# Patient Record
Sex: FEMALE | Race: BLACK OR AFRICAN AMERICAN | NOT HISPANIC OR LATINO | Employment: FULL TIME | URBAN - METROPOLITAN AREA
[De-identification: names, ages, dates, MRNs, and addresses within clinical notes are randomized per-mention and may not be internally consistent; named-entity substitution may affect disease eponyms.]

---

## 2017-07-10 ENCOUNTER — TRANSFERRED RECORDS (OUTPATIENT)
Dept: HEALTH INFORMATION MANAGEMENT | Facility: CLINIC | Age: 27
End: 2017-07-10

## 2017-07-10 LAB — PAP-ABSTRACT: NORMAL

## 2018-11-01 ENCOUNTER — TRANSFERRED RECORDS (OUTPATIENT)
Dept: HEALTH INFORMATION MANAGEMENT | Facility: CLINIC | Age: 28
End: 2018-11-01

## 2018-11-01 LAB — PAP SMEAR - HIM PATIENT REPORTED: NORMAL

## 2019-06-21 ENCOUNTER — OFFICE VISIT (OUTPATIENT)
Dept: FAMILY MEDICINE | Facility: CLINIC | Age: 29
End: 2019-06-21
Payer: COMMERCIAL

## 2019-06-21 DIAGNOSIS — N76.0 BV (BACTERIAL VAGINOSIS): Primary | ICD-10-CM

## 2019-06-21 DIAGNOSIS — R03.0 ELEVATED BLOOD PRESSURE READING WITHOUT DIAGNOSIS OF HYPERTENSION: ICD-10-CM

## 2019-06-21 DIAGNOSIS — Z11.4 SCREENING FOR HUMAN IMMUNODEFICIENCY VIRUS WITHOUT PRESENCE OF RISK FACTORS: ICD-10-CM

## 2019-06-21 DIAGNOSIS — Z00.00 ROUTINE GENERAL MEDICAL EXAMINATION AT A HEALTH CARE FACILITY: Primary | ICD-10-CM

## 2019-06-21 DIAGNOSIS — B96.89 BV (BACTERIAL VAGINOSIS): Primary | ICD-10-CM

## 2019-06-21 LAB
HBA1C MFR BLD: 5.6 % (ref 0–5.6)
SPECIMEN SOURCE: ABNORMAL
TSH SERPL DL<=0.005 MIU/L-ACNC: 1.24 MU/L (ref 0.4–4)
WET PREP SPEC: ABNORMAL

## 2019-06-21 PROCEDURE — 99385 PREV VISIT NEW AGE 18-39: CPT | Mod: 25 | Performed by: FAMILY MEDICINE

## 2019-06-21 PROCEDURE — 90651 9VHPV VACCINE 2/3 DOSE IM: CPT | Performed by: FAMILY MEDICINE

## 2019-06-21 PROCEDURE — 83036 HEMOGLOBIN GLYCOSYLATED A1C: CPT | Performed by: FAMILY MEDICINE

## 2019-06-21 PROCEDURE — 36415 COLL VENOUS BLD VENIPUNCTURE: CPT | Performed by: FAMILY MEDICINE

## 2019-06-21 PROCEDURE — 87389 HIV-1 AG W/HIV-1&-2 AB AG IA: CPT | Performed by: FAMILY MEDICINE

## 2019-06-21 PROCEDURE — 87210 SMEAR WET MOUNT SALINE/INK: CPT | Performed by: FAMILY MEDICINE

## 2019-06-21 PROCEDURE — 90471 IMMUNIZATION ADMIN: CPT | Performed by: FAMILY MEDICINE

## 2019-06-21 PROCEDURE — 84443 ASSAY THYROID STIM HORMONE: CPT | Performed by: FAMILY MEDICINE

## 2019-06-21 RX ORDER — METRONIDAZOLE 500 MG/1
500 TABLET ORAL 2 TIMES DAILY
Qty: 14 TABLET | Refills: 0 | Status: SHIPPED | OUTPATIENT
Start: 2019-06-21 | End: 2019-07-03

## 2019-06-21 SDOH — HEALTH STABILITY: MENTAL HEALTH: HOW OFTEN DO YOU HAVE A DRINK CONTAINING ALCOHOL?: MONTHLY OR LESS

## 2019-06-21 SDOH — HEALTH STABILITY: MENTAL HEALTH: HOW OFTEN DO YOU HAVE SIX OR MORE DRINKS ON ONE OCCASION?: NEVER

## 2019-06-21 SDOH — HEALTH STABILITY: MENTAL HEALTH: HOW MANY DRINKS CONTAINING ALCOHOL DO YOU HAVE ON A TYPICAL DAY WHEN YOU ARE DRINKING?: 1 OR 2

## 2019-06-21 ASSESSMENT — PAIN SCALES - GENERAL: PAINLEVEL: NO PAIN (0)

## 2019-06-21 ASSESSMENT — MIFFLIN-ST. JEOR: SCORE: 2119.31

## 2019-06-21 NOTE — RESULT ENCOUNTER NOTE
Please call    Dear Jennifer,   Here are your recent results.  Your vaginal swab was negative for trichomonas but it was positive for Clue cells which are consistent with BV.   - Your vaginal swab shows clue cells which are consistent with bacterial vaginosis. Which is basically a shift in the normal bacterial wolf in your vaginal area. Sometimes this resolves by itself and does not need treatment. For a small number of people, they notice a discharge or odor.  Probiotics and eating yogurt will help prevent it in the future. I sent a prescription of metronidazole to your pharmacy which can help with the return to normal bacterial wolf.       Best regards,  Berna Valladares MD

## 2019-06-21 NOTE — NURSING NOTE
"Chief Complaint   Patient presents with     Physical     /81 (BP Location: Left arm, Patient Position: Sitting, Cuff Size: Adult Large)   Pulse 93   Temp 98.2  F (36.8  C) (Oral)   Resp 16   Ht 1.892 m (6' 2.5\")   Wt 139.9 kg (308 lb 7 oz)   LMP 05/31/2019 (Approximate)   SpO2 98%   Breastfeeding? No   BMI 39.07 kg/m   Estimated body mass index is 39.07 kg/m  as calculated from the following:    Height as of this encounter: 1.892 m (6' 2.5\").    Weight as of this encounter: 139.9 kg (308 lb 7 oz).  bp completed using cuff size: large      Health Maintenance addressed:  NONE    N/a  Nelly Damon CMA on 6/21/2019 at 11:26 AM                "

## 2019-06-21 NOTE — NURSING NOTE
Chief Complaint   Patient presents with     Physical     Screening Questionnaire for Adult Immunization    Are you sick today?   No   Do you have allergies to medications, food, a vaccine component or latex?   Yes   Have you ever had a serious reaction after receiving a vaccination?   No   Do you have a long-term health problem with heart disease, lung disease, asthma, kidney disease, metabolic disease (e.g. diabetes), anemia, or other blood disorder?   No   Do you have cancer, leukemia, HIV/AIDS, or any other immune system problem?   No   In the past 3 months, have you taken medications that affect  your immune system, such as prednisone, other steroids, or anticancer drugs; drugs for the treatment of rheumatoid arthritis, Crohn s disease, or psoriasis; or have you had radiation treatments?   No   Have you had a seizure, or a brain or other nervous system problem?   No   During the past year, have you received a transfusion of blood or blood     products, or been given immune (gamma) globulin or antiviral drug?   No   For women: Are you pregnant or is there a chance you could become        pregnant during the next month?   No   Have you received any vaccinations in the past 4 weeks?   No     Immunization questionnaire answers were all negative.        Per orders of Dr. Valladares, injection of HPV given by Nelly Damon. Patient instructed to remain in clinic for 15 minutes afterwards, and to report any adverse reaction to me immediately.       Screening performed by Nelly Damon on 6/21/2019 at 12:12 PM.

## 2019-06-21 NOTE — PROGRESS NOTES
SUBJECTIVE:   CC: Jennifer Reyes is an 28 year old woman who presents for preventive health visit.     Healthy Habits:     Getting at least 3 servings of Calcium per day:  Yes    Bi-annual eye exam:  Yes    Dental care twice a year:  Yes    Sleep apnea or symptoms of sleep apnea:  Excessive snoring    Diet:  Regular (no restrictions)    Frequency of exercise:  2-3 days/week    Duration of exercise:  30-45 minutes    Taking medications regularly:  Yes    Medication side effects:  None    PHQ-2 Total Score: 0    Additional concerns today:  Yes    Today's PHQ-2 Score:   PHQ-2 ( 1999 [employer]) 6/21/2019   Q1: Little interest or pleasure in doing things 0   Q2: Feeling down, depressed or hopeless 0   PHQ-2 Score 0   Q1: Little interest or pleasure in doing things Not at all   Q2: Feeling down, depressed or hopeless Not at all   PHQ-2 Score 0       Abuse: Current or Past(Physical, Sexual or Emotional)- No  Do you feel safe in your environment? Yes    Social History     Tobacco Use     Smoking status: Never Smoker     Smokeless tobacco: Never Used   Substance Use Topics     Alcohol use: Yes     Frequency: Monthly or less     Drinks per session: 1 or 2     Binge frequency: Never     Comment: Soc-wine beer mixed drink     If you drink alcohol do you typically have >3 drinks per day or >7 drinks per week? No    Alcohol Use 6/21/2019   Prescreen: >3 drinks/day or >7 drinks/week? No   Prescreen: >3 drinks/day or >7 drinks/week? -   No flowsheet data found.    Reviewed orders with patient.  Reviewed health maintenance and updated orders accordingly - Yes  Lab work is in process    Mammogram not appropriate for this patient based on age.    Pertinent mammograms are reviewed under the imaging tab.  History of abnormal Pap smear: NO - under age 21, PAP not appropriate for age     Reviewed and updated as needed this visit by clinical staff  Tobacco  Allergies  Meds  Surg Hx  Fam Hx  Soc Hx        Reviewed and updated as  "needed this visit by Provider            Review of Systems  CONSTITUTIONAL: NEGATIVE for fever, chills, change in weight  INTEGUMENTARU/SKIN: NEGATIVE for worrisome rashes, moles or lesions  EYES: NEGATIVE for vision changes or irritation  ENT: NEGATIVE for ear, mouth and throat problems  RESP: NEGATIVE for significant cough or SOB  BREAST: NEGATIVE for masses, tenderness or discharge  CV: NEGATIVE for chest pain, palpitations or peripheral edema  GI: NEGATIVE for nausea, abdominal pain, heartburn, or change in bowel habits  : NEGATIVE for unusual urinary or vaginal symptoms. Periods are regular.  MUSCULOSKELETAL: NEGATIVE for significant arthralgias or myalgia  NEURO: NEGATIVE for weakness, dizziness or paresthesias  PSYCHIATRIC: NEGATIVE for changes in mood or affect     OBJECTIVE:   /81 (BP Location: Left arm, Patient Position: Sitting, Cuff Size: Adult Large)   Pulse 93   Temp 98.2  F (36.8  C) (Oral)   Resp 16   Ht 1.892 m (6' 2.5\")   Wt 139.9 kg (308 lb 7 oz)   LMP 05/31/2019 (Approximate)   SpO2 98%   Breastfeeding? No   BMI 39.07 kg/m    Physical Exam  GENERAL: healthy, alert and no distress  EYES: Eyes grossly normal to inspection, PERRL and conjunctivae and sclerae normal  HENT: ear canals and TM's normal, nose and mouth without ulcers or lesions  NECK: no adenopathy, no asymmetry, masses, or scars and thyroid normal to palpation  RESP: lungs clear to auscultation - no rales, rhonchi or wheezes  BREAST: normal without masses, tenderness or nipple discharge and no palpable axillary masses or adenopathy  CV: regular rate and rhythm, normal S1 S2, no S3 or S4, no murmur, click or rub, no peripheral edema and peripheral pulses strong  ABDOMEN: soft, nontender, no hepatosplenomegaly, no masses and bowel sounds normal  MS: no gross musculoskeletal defects noted, no edema  SKIN: no suspicious lesions or rashes  NEURO: Normal strength and tone, mentation intact and speech normal  PSYCH: mentation " appears normal, affect normal/bright    Diagnostic Test Results:  Labs reviewed in Epic    ASSESSMENT/PLAN:       ICD-10-CM    1. Routine general medical examination at a health care facility Z00.00 TSH with free T4 reflex     Hemoglobin A1c     ADMIN 1st VACCINE     Wet prep   2. Screening for human immunodeficiency virus without presence of risk factors Z11.4 HIV Antigen Antibody Combo   3. Elevated blood pressure reading without diagnosis of hypertension R03.0      [facility]       COUNSELING:  Reviewed preventive health counseling, as reflected in patient instructions       Regular exercise       Healthy diet/nutrition       Vision screening       Hearing screening    Counseling Resources:  ATP IV Guidelines  Pooled Cohorts Equation Calculator  Breast Cancer Risk Calculator  FRAX Risk Assessment  ICSI Preventive Guidelines  Dietary Guidelines for Americans, 2010  USDA's MyPlate  ASA Prophylaxis  Lung CA Screening    Berna Valladares MD  [facility]

## 2019-06-24 LAB — HIV 1+2 AB+HIV1 P24 AG SERPL QL IA: NONREACTIVE

## 2019-07-01 ENCOUNTER — TELEPHONE (OUTPATIENT)
Dept: NURSING | Facility: CLINIC | Age: 29
End: 2019-07-01

## 2019-07-01 NOTE — TELEPHONE ENCOUNTER
Patient returning clinic call regarding results. Gave per epic, she picked up RX for   metroNIDAZOLE (FLAGYL) 500 MG tablet 14 tablet 0 6/21/2019 6/28/2019 --   Sig - Route: Take 1 tablet (500 mg) by mouth 2 times daily for 7 days - Oral     She will call clinic in am for a referral MD was suppose to give her. No triage was needed.  Debi Bach RN [facility]

## 2019-07-02 ENCOUNTER — TELEPHONE (OUTPATIENT)
Dept: FAMILY MEDICINE | Facility: CLINIC | Age: 29
End: 2019-07-02

## 2019-07-02 NOTE — TELEPHONE ENCOUNTER
"FS,   Patient had recent physical with you 6/21/2019  States she mentioned having issues with weight loss  You advised checking labs  A1c and TSH normal  Pt states you mentioned after labs you may Rx a \"metabolism booster\"  Do you want a phone visit to discuss medication options with pt?  Please advise  Thanks,  Zhanna ROBIN RN    "

## 2019-07-02 NOTE — TELEPHONE ENCOUNTER
Reason for Call:  Medication or medication refill:    Do you use a [facility]?  Name of the pharmacy and phone number for the current request:    [id number] - [place], [place] - [address]      Name of the medication requested:   Weight loss medication, metabolism booster discussed at appt with Blaine    Other request:   Pt is following up with results concerning her thyroid and diabetes, however she is inquiring about medication.      Can we leave a detailed message on this number? YES    Phone number patient can be reached at: Home number on file [phone] (home)    Best Time: Any     Call taken on 7/2/2019 at 11:48 AM by Jesika Connelly

## 2019-07-02 NOTE — TELEPHONE ENCOUNTER
Lab result notes:  Your vaginal swab was negative for trichomonas but it was positive for Clue cells which are consistent with BV.   - Your vaginal swab shows clue cells which are consistent with bacterial vaginosis. Which is basically a shift in the normal bacterial wolf in your vaginal area. Sometimes this resolves by itself and does not need treatment. For a small number of people, they notice a discharge or odor.  Probiotics and eating yogurt will help prevent it in the future. I sent a prescription of metronidazole to your pharmacy which can help with the return to normal bacterial wolf    No mention of weigh loss med in OV notes  VM left asking patient to call back.  Aurelia Souza RN

## 2019-07-03 ENCOUNTER — TELEPHONE (OUTPATIENT)
Dept: FAMILY MEDICINE | Facility: CLINIC | Age: 29
End: 2019-07-03

## 2019-07-03 ENCOUNTER — VIRTUAL VISIT (OUTPATIENT)
Dept: FAMILY MEDICINE | Facility: CLINIC | Age: 29
End: 2019-07-03
Payer: COMMERCIAL

## 2019-07-03 DIAGNOSIS — E66.01 MORBID OBESITY (H): ICD-10-CM

## 2019-07-03 DIAGNOSIS — E66.01 MORBID OBESITY (H): Primary | ICD-10-CM

## 2019-07-03 PROCEDURE — 99441 ZZC PHYSICIAN TELEPHONE EVALUATION 5-10 MIN: CPT | Performed by: FAMILY MEDICINE

## 2019-07-03 RX ORDER — TOPIRAMATE 25 MG/1
TABLET, FILM COATED ORAL
Qty: 63 TABLET | Refills: 0 | Status: SHIPPED | OUTPATIENT
Start: 2019-07-03 | End: 2019-07-05

## 2019-07-03 RX ORDER — TOPIRAMATE 25 MG/1
TABLET, FILM COATED ORAL
Qty: 63 TABLET | Refills: 0 | Status: SHIPPED | OUTPATIENT
Start: 2019-07-03 | End: 2019-07-03

## 2019-07-03 RX ORDER — PHENTERMINE HYDROCHLORIDE 15 MG/1
15 CAPSULE ORAL EVERY MORNING
Qty: 30 CAPSULE | Refills: 0 | Status: SHIPPED | OUTPATIENT
Start: 2019-07-03 | End: 2019-07-31

## 2019-07-03 NOTE — PROGRESS NOTES
"Jennifer Reyes is a 28 year old female who is being evaluated via a telephone visit.      The patient has been notified of following (by M.A) Nelly Damon [facility] on 7/3/2019 at 12:49 PM       \"We have found that certain health care needs can be provided without the need for a physical exam.  This service lets us provide the care you need with a short phone conversation.  If a prescription is necessary we can send it directly to your pharmacy.  If lab work is needed we can place an order for that and you can then stop by our lab to have the test done at a later time.    This telephone visit will be conducted via 3 way call with the you (the patient) , the physician/provider, and a me all on the line at the same time.  This allows your physician/provider to have the phone conversation with you while I will be taking notes for your medical record.  We will have full access to your [facility] medical record during this entire phone call.    Since this is like an office visit,  will bill your insurance company for this service.  Please check with your medical insurance if this type of telephone/virtual is covered . You may be responsible for the cost of this service if insurance coverage is denied.  The typical cost is $30 (10min), $59(11-20min) and $85 (21-30min)     If during the course of the call the physician/provider feels a telephone visit is not appropriate, you will not be charged for this service\"    Consent has been obtained for this service by care team member: yes.  See the scanned image in the medical record.    S: The patient was seen previously for morbid obesity. She was tested for diabetes and thyroid and both tests were normal. She desires to start a medication to lose weight. She does not have any chronic medical conditions. Does not take any other medications at this time.     A/P:   Multiple medications were discussed with the patient. I would recommend proceeding with phentermine and Topamax. " Patient agrees with plan. Most possible side-effects were discussed with the patient. She was advised to schedule an appointment in 3-4 weeks to review medications.         I have reviewed the note as documented above.  This accurately captures the substance of my conversation with the patient,    Assessment/Plan:    ICD-10-CM    1. Morbid obesity (H) E66.01 phentermine (ADIPEX-P) 15 MG capsule     topiramate (TOPAMAX) 25 MG tablet       Total time of call between patient and provider was 7 minutes       Berna Valladares MD  [facility]  PAGER: [phone] or (W): [phone]      ===================================================

## 2019-07-03 NOTE — PATIENT INSTRUCTIONS
MEDICATION STARTED AT THIS APPOINTMENT  We are starting topiramate at bedtime.  Start one tab, 25 mg, for a week. Go up to 50 mg (2 tabs) for the next week. At the third week, take  3 tabs (75 mg).  Stay at 3 tabs until you are seen again.     (Do not stop taking it if you don't think it's working. For some people it works even though they do not feel much different.)    Topiramate (Topamax) is a medication that is used most often to treat migraine headaches or for seizures. It has also been found to help with weight loss. Although it's not currently FDA approved for weight loss, it has been used safely for a number of years to help people who are carrying extra weight.     Just how topiramate helps with weight loss has not been exactly determined. However it seems to work on areas of the brain to quiet down signals related to eating.      Topiramate may make you:    >feel less interest in eating in between meals   >think less about food and eating   >find it easier to push the plate away   >find giving up pop easier    >have an easier time eating less    For some of our patients, the pills work right away. They feel and think quite differently about food. Other patients don't feel much of a change but find in fact they have lost weight! Like all weight loss medications, topiramate works best when you help it work.  This means:    1) Have less tempting high calorie (fattening) food around the house or office    2) Have lower calorie food (fruits, vegetables,low fat meats and dairy) for snacks    3) Eat out only one time or less each week.   4) Eat your meals at a table with the TV or computer off.    Side-effects. Topiramate is generally well tolerated. The main side-effects we see are:   Tingling in hands,feet, or face (usually not very troublesome)   Mental confusion and word finding trouble (about 10% of patients have this.)     Feeling sleepy or a bit dopey- this goes away very soon after starting.    One of the  dangers of topiramate is the possibility of birth defects--if you get pregnant when you are on it, there is the risk that your baby will be born with a cleft lip or palate.  If you are on topiramate and of child bearing age, you need to be on a reliable form of birth control or refrain from sexual intercourse.     Please refer to the pharmacy insert for more information on side-effects. Since many pharmacists are not familiar with the use of topiramate in weight loss, calling the clinic will get you the most accurate information on the use of this medication for weight loss.     In order to get refills of this or any medication we prescribe you must be seen in the clinic.

## 2019-07-04 NOTE — TELEPHONE ENCOUNTER
Clinic Action Needed:No  Reason for Call: Patient  her hard copy of her Adipex-P at the clinic before she left, but didn't realize the topamax was in hard copy form also. Would like the Topamax Rx sent to the [id number] [place], [place] - [address]. She is there now. Done.   Routed to: None    Rosio Cortes RN, [facility]

## 2019-07-05 ENCOUNTER — TELEPHONE (OUTPATIENT)
Dept: FAMILY MEDICINE | Facility: CLINIC | Age: 29
End: 2019-07-05

## 2019-07-05 DIAGNOSIS — E66.01 MORBID OBESITY (H): ICD-10-CM

## 2019-07-05 RX ORDER — TOPIRAMATE 25 MG/1
TABLET, FILM COATED ORAL
Qty: 63 TABLET | Refills: 0 | Status: SHIPPED | OUTPATIENT
Start: 2019-07-05 | End: 2019-07-31

## 2019-07-05 NOTE — TELEPHONE ENCOUNTER
Reason for Call:  Medication or medication refill:    Do you use a [facility]?  Name of the pharmacy and phone number for the current request:  [id number] - [place], [place] - [address]    Name of the medication requested: topiramate (TOPAMAX) 25 MG tablet    Other request:  Pt would like to  this rx at the pharmacy instead of hard copy     Can we leave a detailed message on this number? YES    Phone number patient can be reached at: Home number on file [phone] (home)    Best Time: any    Call taken on 7/5/2019 at 11:16 AM by Chi Alvarez

## 2019-07-05 NOTE — TELEPHONE ENCOUNTER
Rx printed because sig too long  Shortened sig and eRx'd Topamax  Patient informed  Zhanna ROBIN RN

## 2019-07-31 ENCOUNTER — OFFICE VISIT (OUTPATIENT)
Dept: FAMILY MEDICINE | Facility: CLINIC | Age: 29
End: 2019-07-31
Payer: COMMERCIAL

## 2019-07-31 DIAGNOSIS — E66.01 MORBID OBESITY (H): ICD-10-CM

## 2019-07-31 PROCEDURE — 99213 OFFICE O/P EST LOW 20 MIN: CPT | Performed by: FAMILY MEDICINE

## 2019-07-31 RX ORDER — PHENTERMINE HYDROCHLORIDE 30 MG/1
30 CAPSULE ORAL EVERY MORNING
Qty: 30 CAPSULE | Refills: 0 | Status: SHIPPED | OUTPATIENT
Start: 2019-07-31 | End: 2019-08-30

## 2019-07-31 RX ORDER — TOPIRAMATE 25 MG/1
75 TABLET, FILM COATED ORAL AT BEDTIME
Qty: 90 TABLET | Refills: 0 | Status: SHIPPED | OUTPATIENT
Start: 2019-07-31 | End: 2019-08-30

## 2019-07-31 ASSESSMENT — MIFFLIN-ST. JEOR: SCORE: 2082.86

## 2019-07-31 NOTE — PROGRESS NOTES
"Subjective     Jennifer Reyes is a 29 year old female who presents to clinic today for the following health issues:    HPI   Medication Followup of Topamax and Phentermine     Taking Medication as prescribed: yes    Side Effects:  Dry mouth     Medication Helping Symptoms:  Yes, lost 10lbs in the past month      Patient was started on medications for weight loss management.  She denies any complaints  other than occasional dry mouth.    There is no problem list on file for this patient.    History reviewed. No pertinent surgical history.    Social History     Tobacco Use     Smoking status: Never Smoker     Smokeless tobacco: Never Used   Substance Use Topics     Alcohol use: Yes     Frequency: Monthly or less     Drinks per session: 1 or 2     Binge frequency: Never     Comment: Soc-wine beer mixed drink     Family History   Adopted: Yes           Reviewed and updated as needed this visit by Provider         Review of Systems   ROS COMP: Constitutional, HEENT, cardiovascular, pulmonary, gi and gu systems are negative, except as otherwise noted.      Objective    /87   Pulse 85   Temp 97.5  F (36.4  C) (Oral)   Resp 20   Ht 1.892 m (6' 2.5\")   Wt 136.3 kg (300 lb 6.4 oz)   SpO2 98%   BMI 38.05 kg/m    Body mass index is 38.05 kg/m .  Physical Exam   GENERAL: healthy, alert and no distress  RESP: lungs clear to auscultation - no rales, rhonchi or wheezes  CV: regular rate and rhythm, normal S1 S2, no S3 or S4, no murmur, click or rub, no peripheral edema and peripheral pulses strong    Diagnostic Test Results:  Labs reviewed in Epic        Assessment & Plan       ICD-10-CM    1. Morbid obesity (H) E66.01 topiramate (TOPAMAX) 25 MG tablet     phentermine (ADIPEX-P) 30 MG capsule     Patient was informed that she should present for a face-to-face visit for medication refill.  Dose was increased today from 15mg to 30 mg.    Return in about 1 month (around 8/31/2019) for Follow-up visit.    Berna VALDEZ" MD Bl[facility]  [facility]

## 2019-08-01 RX ORDER — TOPIRAMATE 25 MG/1
TABLET, FILM COATED ORAL
Start: 2019-08-01

## 2019-08-01 NOTE — TELEPHONE ENCOUNTER
"Last Written Prescription Date:  8/30/2019  Last Fill Quantity: 90,  # refills: 0   Last office visit: 7/31/2019 with prescribing provider:  Blaine   Future Office Visit:   Next 5 appointments (look out 90 days)    Aug 30, 2019  9:00 AM CDT  Office Visit with Berna Valladares MD  [facility] [address] [facility] [address]  [phone]   Sep 23, 2019 10:15 AM CDT  Nurse Only with UP NURSE  [facility] ([facility]) [address] [address]  [phone]         Requested Prescriptions   Pending Prescriptions Disp Refills     topiramate (TOPAMAX) 25 MG tablet 63 tablet 0     Sig: Take 1 tab (25mg) by mouth at bedtime for 7 days, then 2 tabs (50mg) at bedtime for 7 days, then 3 tabs (75mg) at bedtime for 14 days       Anti-Seizure Meds Protocol  Failed - 7/31/2019  2:18 PM        Failed - Review Authorizing provider's last note.      Refer to last progress notes: confirm request is for original authorizing provider (cannot be through other providers).          Failed - Normal CBC on file in past 26 months     No lab results found.              Failed - Normal ALT or AST on file in past 26 months     No lab results found.  No lab results found.          Failed - Normal platelet count on file in past 26 months     No lab results found.            Passed - Recent (12 mo) or future (30 days) visit within the authorizing provider's specialty     Patient had office visit in the last 12 months or has a visit in the next 30 days with authorizing provider or within the authorizing provider's specialty.  See \"Patient Info\" tab in inbasket, or \"Choose Columns\" in Meds & Orders section of the refill encounter.              Passed - Medication is active on med list        Passed - No active pregnancy on record        Passed - No positive pregnancy test in last 12 months          "

## 2019-08-29 DIAGNOSIS — E66.01 MORBID OBESITY (H): ICD-10-CM

## 2019-08-29 RX ORDER — TOPIRAMATE 25 MG/1
75 TABLET, FILM COATED ORAL AT BEDTIME
Qty: 90 TABLET | Refills: 0 | Status: CANCELLED | OUTPATIENT
Start: 2019-08-29

## 2019-08-29 NOTE — TELEPHONE ENCOUNTER
"topiramate (TOPAMAX) 25 MG tablet  Last Written Prescription Date:  07/31/20  Last Fill Quantity: 90,  # refills:  0  Last office visit: 7/31/2019 with prescribing provider:  AMINTA   Future Office Visit: Nothing at this time scheduled.    Next 5 appointments (look out 90 days)    Aug 30, 2019  9:00 AM CDT  Office Visit with Berna Valladares MD  [facility] ([facility]) [address] [address]  [phone]   Sep 23, 2019 10:15 AM CDT  Nurse Only with UP NURSE  [facility] [place] ([facility]) [address] [address]  [phone]         Requested Prescriptions   Pending Prescriptions Disp Refills     topiramate (TOPAMAX) 25 MG tablet 90 tablet 0     Sig: Take 3 tablets (75 mg) by mouth At Bedtime       Anti-Seizure Meds Protocol  Failed - 8/29/2019  2:31 PM        Failed - Review Authorizing provider's last note.      Refer to last progress notes: confirm request is for original authorizing provider (cannot be through other providers).          Failed - Normal CBC on file in past 26 months     No lab results found.              Failed - Normal ALT or AST on file in past 26 months     No lab results found.  No lab results found.          Failed - Normal platelet count on file in past 26 months     No lab results found.            Passed - Recent (12 mo) or future (30 days) visit within the authorizing provider's specialty     Patient had office visit in the last 12 months or has a visit in the next 30 days with authorizing provider or within the authorizing provider's specialty.  See \"Patient Info\" tab in inbasket, or \"Choose Columns\" in Meds & Orders section of the refill encounter.              Passed - Medication is active on med list        Passed - No active pregnancy on record        Passed - No positive pregnancy test in last 12 months        "

## 2019-08-30 ENCOUNTER — OFFICE VISIT (OUTPATIENT)
Dept: FAMILY MEDICINE | Facility: CLINIC | Age: 29
End: 2019-08-30
Payer: COMMERCIAL

## 2019-08-30 DIAGNOSIS — E66.01 MORBID OBESITY (H): ICD-10-CM

## 2019-08-30 PROCEDURE — 99213 OFFICE O/P EST LOW 20 MIN: CPT | Performed by: FAMILY MEDICINE

## 2019-08-30 RX ORDER — TOPIRAMATE 25 MG/1
75 TABLET, FILM COATED ORAL AT BEDTIME
Qty: 90 TABLET | Refills: 3 | Status: SHIPPED | OUTPATIENT
Start: 2019-08-30 | End: 2019-12-02

## 2019-08-30 RX ORDER — PHENTERMINE HYDROCHLORIDE 30 MG/1
30 CAPSULE ORAL EVERY MORNING
Qty: 30 CAPSULE | Refills: 3 | Status: SHIPPED | OUTPATIENT
Start: 2019-08-30 | End: 2019-10-07

## 2019-08-30 ASSESSMENT — MIFFLIN-ST. JEOR: SCORE: 2045.66

## 2019-08-30 NOTE — PROGRESS NOTES
"Steve Reyes is a 29 year old female who presents to clinic today for the following health issues:    HPI   Patient is here for a follow up visit, and medication check for weight loss.    Steve Reyes is a 29 year old female who presents to clinic today for the following health issues:    HPI   Medication Followup of Topamax and Phentermine     Taking Medication as prescribed: yes    Side Effects:  Dry mouth     Medication Helping Symptoms:  Yes, lost 10lbs in the past month      Patient was started on medications for weight loss management.  She denies any complaints  other than occasional dry mouth.    There is no problem list on file for this patient.    No past surgical history on file.    Social History     Tobacco Use     Smoking status: Never Smoker     Smokeless tobacco: Never Used   Substance Use Topics     Alcohol use: Yes     Frequency: Monthly or less     Drinks per session: 1 or 2     Binge frequency: Never     Comment: Soc-wine beer mixed drink     Family History   Adopted: Yes           Reviewed and updated as needed this visit by Provider         Review of Systems   ROS COMP: Constitutional, HEENT, cardiovascular, pulmonary, gi and gu systems are negative, except as otherwise noted.      Objective    /84   Pulse 105   Temp 97.8  F (36.6  C) (Oral)   Ht 1.892 m (6' 2.5\")   Wt 132.5 kg (292 lb 3.2 oz)   LMP 08/09/2019 (Approximate)   SpO2 99%   BMI 37.01 kg/m    Body mass index is 37.01 kg/m .  Physical Exam   GENERAL: healthy, alert and no distress  RESP: lungs clear to auscultation - no rales, rhonchi or wheezes  CV: regular rate and rhythm, normal S1 S2, no S3 or S4, no murmur, click or rub, no peripheral edema and peripheral pulses strong    Diagnostic Test Results:  Labs reviewed in Epic        Assessment & Plan       ICD-10-CM    1. Morbid obesity (H) E66.01 phentermine (ADIPEX-P) 30 MG capsule     topiramate (TOPAMAX) 25 MG tablet     Patient " was informed that she should present for a face-to-face visit for medication refill.  Dose was increased today from 15mg to 30 mg.    Return in about 3 months (around 11/30/2019) for Follow-up visit.    Berna Valladares MD  [facility]

## 2019-08-30 NOTE — NURSING NOTE
"No chief complaint on file.    /84   Pulse 105   Temp 97.8  F (36.6  C) (Oral)   Ht 1.892 m (6' 2.5\")   Wt 132.5 kg (292 lb 3.2 oz)   LMP 08/09/2019 (Approximate)   SpO2 99%   BMI 37.01 kg/m   Estimated body mass index is 37.01 kg/m  as calculated from the following:    Height as of this encounter: 1.892 m (6' 2.5\").    Weight as of this encounter: 132.5 kg (292 lb 3.2 oz).  bp completed using cuff size: large      Health Maintenance addressed:  NONE    n/a    Alyson Jaimes MA    "

## 2019-09-10 ENCOUNTER — TELEPHONE (OUTPATIENT)
Dept: FAMILY MEDICINE | Facility: CLINIC | Age: 29
End: 2019-09-10

## 2019-09-10 NOTE — TELEPHONE ENCOUNTER
Reason for Call:  Medication or medication refill:    Do you use a [facility]?  Name of the pharmacy and phone number for the current request:   [facility]/PHARMACY #[id number] - [place], [place] - [address]    Name of the medication requested:   phentermine (ADIPEX-P) 30 MG capsule    Other request: Pt called and was wondering if there was a way that this RX could be changed to make it so she can get more than 30 days at a time filled. She would like a call back either way about this.     Can we leave a detailed message on this number? YES    Phone number patient can be reached at: Cell number on file:    Telephone Information:   Mobile [phone]       Best Time: Any    Call taken on 9/10/2019 at 10:22 AM by Emperatriz Gannon

## 2019-09-11 NOTE — TELEPHONE ENCOUNTER
Because this is a controlled substance, I would not recommend dispensing large quantities. Monthly refills should be fine for now.     Blaine

## 2019-09-23 ENCOUNTER — ALLIED HEALTH/NURSE VISIT (OUTPATIENT)
Dept: NURSING | Facility: CLINIC | Age: 29
End: 2019-09-23
Payer: COMMERCIAL

## 2019-09-23 DIAGNOSIS — Z23 NEED FOR HPV VACCINE: Primary | ICD-10-CM

## 2019-09-23 PROCEDURE — 90471 IMMUNIZATION ADMIN: CPT

## 2019-09-23 PROCEDURE — 99207 ZZC NO CHARGE NURSE ONLY: CPT

## 2019-09-23 PROCEDURE — 90651 9VHPV VACCINE 2/3 DOSE IM: CPT

## 2019-09-23 NOTE — NURSING NOTE
Prior to immunization administration, verified patients identity using patient s name and date of birth. Please see Immunization Activity for additional information.     Screening Questionnaire for Adult Immunization    Are you sick today?   No   Do you have allergies to medications, food, a vaccine component or latex?   No   Have you ever had a serious reaction after receiving a vaccination?   No   Do you have a long-term health problem with heart disease, lung disease, asthma, kidney disease, metabolic disease (e.g. diabetes), anemia, or other blood disorder?   No   Do you have cancer, leukemia, HIV/AIDS, or any other immune system problem?   No   In the past 3 months, have you taken medications that affect  your immune system, such as prednisone, other steroids, or anticancer drugs; drugs for the treatment of rheumatoid arthritis, Crohn s disease, or psoriasis; or have you had radiation treatments?   No   Have you had a seizure, or a brain or other nervous system problem?   No   During the past year, have you received a transfusion of blood or blood     products, or been given immune (gamma) globulin or antiviral drug?   No   For women: Are you pregnant or is there a chance you could become        pregnant during the next month?   No   Have you received any vaccinations in the past 4 weeks?   No     Immunization questionnaire answers were all negative.        Per orders of Dr. Valladares, injection of HPV 3rd dose given by Alyson Jaimes MA. Patient instructed to remain in clinic for 15 minutes afterwards, and to report any adverse reaction to me immediately.       Screening performed by Alyson Jaimes MA on 9/23/2019 at 10:31 AM.

## 2019-10-03 ENCOUNTER — TELEPHONE (OUTPATIENT)
Dept: FAMILY MEDICINE | Facility: CLINIC | Age: 29
End: 2019-10-03

## 2019-10-03 DIAGNOSIS — E66.01 MORBID OBESITY (H): ICD-10-CM

## 2019-10-03 NOTE — TELEPHONE ENCOUNTER
Reason for Call:  Medication or medication refill:    Do you use a [facility]?  Name of the pharmacy and phone number for the current request:    [facility]/PHARMACY #[id number] - [place], [place] - [address]      Name of the medication requested:   phentermine (ADIPEX-P) 30 MG capsule [[id number]] (Order [id number])    Other request: Pt is calling to request refill on this since it needs prior auth per pharmacy instruction. Pt has only 2 pills left.    Can we leave a detailed message on this number? YES    Phone number patient can be reached at: Home number on file [phone] (home)    Best Time: any    Call taken on 10/3/2019 at 4:14 PM by Maxx Viveros

## 2019-10-03 NOTE — TELEPHONE ENCOUNTER
Patient informed there are three refills left on Rx. Pt will call pharmacy.    Karey Castellano RN

## 2019-10-04 NOTE — TELEPHONE ENCOUNTER
Called [facility] and on hold for 10+ min again.  Will try again later.  Shellie Thomas RN      MPM: last ordered 8/30/19#30, 7/31/19 #30 and 7/3/19 #30

## 2019-10-04 NOTE — TELEPHONE ENCOUNTER
Spoke to pt and [facility] (new pharm) is not able to transfer and needs a new rx as it is a controled substance.   Called [facility] to cancell current order and see when it was last picked up and was on hold 7+ min.  Will try again later then send on to PCP.    Shellie Thomas RN

## 2019-10-04 NOTE — TELEPHONE ENCOUNTER
Pt called wondering if we can switch pharmacies to:    [facility] #[id number] - [place], [place] - [address] AT [address] [facility] [address]    Pt ph: [phone]

## 2019-10-07 RX ORDER — PHENTERMINE HYDROCHLORIDE 30 MG/1
30 CAPSULE ORAL EVERY MORNING
Qty: 30 CAPSULE | Refills: 2 | Status: SHIPPED | OUTPATIENT
Start: 2019-10-07 | End: 2019-12-02

## 2019-10-07 NOTE — TELEPHONE ENCOUNTER
FS,   Please see below  Pt switching [facility] to [facility]  [facility] has refills left  Last Rx for #30 with 3 refills on 8/30/2019  Tried to call [facility] again today to cancel Rx  On hold for 10+ minutes again (see below)  [facility] as can't cancel current Rx at [facility]  Thanks,  Zhanna ROBIN RN

## 2019-12-02 ENCOUNTER — OFFICE VISIT (OUTPATIENT)
Dept: FAMILY MEDICINE | Facility: CLINIC | Age: 29
End: 2019-12-02
Payer: COMMERCIAL

## 2019-12-02 VITALS
BODY MASS INDEX: 49.89 KG/M2 | HEIGHT: 64 IN | SYSTOLIC BLOOD PRESSURE: 137 MMHG | DIASTOLIC BLOOD PRESSURE: 84 MMHG | TEMPERATURE: 97.8 F | HEART RATE: 105 BPM | WEIGHT: 292.2 LBS | OXYGEN SATURATION: 99 %

## 2019-12-02 VITALS
WEIGHT: 285.7 LBS | HEIGHT: 64 IN | TEMPERATURE: 98.1 F | OXYGEN SATURATION: 100 % | SYSTOLIC BLOOD PRESSURE: 142 MMHG | DIASTOLIC BLOOD PRESSURE: 92 MMHG | BODY MASS INDEX: 48.77 KG/M2 | HEART RATE: 95 BPM | RESPIRATION RATE: 14 BRPM

## 2019-12-02 VITALS
OXYGEN SATURATION: 98 % | HEIGHT: 64 IN | BODY MASS INDEX: 50.02 KG/M2 | HEART RATE: 93 BPM | HEIGHT: 64 IN | DIASTOLIC BLOOD PRESSURE: 87 MMHG | WEIGHT: 293 LBS | SYSTOLIC BLOOD PRESSURE: 143 MMHG | HEART RATE: 85 BPM | RESPIRATION RATE: 16 BRPM | DIASTOLIC BLOOD PRESSURE: 81 MMHG | TEMPERATURE: 98.2 F | TEMPERATURE: 97.5 F | BODY MASS INDEX: 50.02 KG/M2 | WEIGHT: 293 LBS | OXYGEN SATURATION: 98 % | SYSTOLIC BLOOD PRESSURE: 136 MMHG | RESPIRATION RATE: 20 BRPM

## 2019-12-02 DIAGNOSIS — E66.01 MORBID OBESITY (H): Primary | ICD-10-CM

## 2019-12-02 DIAGNOSIS — R03.0 ELEVATED BLOOD PRESSURE READING WITHOUT DIAGNOSIS OF HYPERTENSION: ICD-10-CM

## 2019-12-02 PROCEDURE — 99214 OFFICE O/P EST MOD 30 MIN: CPT | Performed by: FAMILY MEDICINE

## 2019-12-02 RX ORDER — TOPIRAMATE 25 MG/1
75 TABLET, FILM COATED ORAL AT BEDTIME
Qty: 90 TABLET | Refills: 2 | Status: SHIPPED | OUTPATIENT
Start: 2019-12-02 | End: 2020-03-02

## 2019-12-02 RX ORDER — PHENTERMINE HYDROCHLORIDE 30 MG/1
30 CAPSULE ORAL EVERY MORNING
Qty: 30 CAPSULE | Refills: 2 | Status: SHIPPED | OUTPATIENT
Start: 2019-12-11 | End: 2020-03-02

## 2019-12-02 ASSESSMENT — MIFFLIN-ST. JEOR: SCORE: 2015.93

## 2019-12-02 ASSESSMENT — PAIN SCALES - GENERAL: PAINLEVEL: NO PAIN (0)

## 2019-12-02 NOTE — PROGRESS NOTES
"Subjective     Jennifer Reyes is a 29 year old female who presents to clinic today for the following health issues:    HPI   Medication Followup of phentermine and topamax    Taking Medication as prescribed: yes    Side Effects:  Dry throat     Medication Helping Symptoms:  yes    She continues to lose weight gradually.   Patient was started on medications for weight loss management.  She denies any complaints  other than occasional dry mouth.    Patient Active Problem List   Diagnosis     Morbid obesity (H)     No past surgical history on file.    Social History     Tobacco Use     Smoking status: Never Smoker     Smokeless tobacco: Never Used   Substance Use Topics     Alcohol use: Yes     Frequency: Monthly or less     Drinks per session: 1 or 2     Binge frequency: Never     Comment: Soc-wine beer mixed drink     Family History   Adopted: Yes           Reviewed and updated as needed this visit by Provider         Review of Systems   ROS COMP: Constitutional, HEENT, cardiovascular, pulmonary, gi and gu systems are negative, except as otherwise noted.      Objective    BP (!) 142/92 (BP Location: Left arm, Patient Position: Sitting, Cuff Size: Adult Large)   Pulse 95   Temp 98.1  F (36.7  C) (Oral)   Resp 14   Ht 1.626 m (5' 4\")   Wt 129.6 kg (285 lb 11.2 oz)   LMP 12/01/2019 (Exact Date)   SpO2 100%   BMI 49.04 kg/m    Body mass index is 49.04 kg/m .  Physical Exam   GENERAL: healthy, alert and no distress  RESP: lungs clear to auscultation - no rales, rhonchi or wheezes  CV: regular rate and rhythm, normal S1 S2, no S3 or S4, no murmur, click or rub, no peripheral edema and peripheral pulses strong    Diagnostic Test Results:  Labs reviewed in Epic        Assessment & Plan       ICD-10-CM    1. Morbid obesity (H) E66.01 phentermine (ADIPEX-P) 30 MG capsule     topiramate (TOPAMAX) 25 MG tablet   2. Elevated blood pressure reading without diagnosis of hypertension R03.0      Patient was informed that " she should present for a face-to-face visit for medication refill in 3 months.     The patient has a hx of gastric bypass. Does not desire to do a revision or surgery again.    continues to increase her physical activity.     Advised to monitor blood pressure at home.     Return in about 3 months (around 3/2/2020) for Follow-up visit.    Berna Valladares MD  [facility]

## 2020-03-02 ENCOUNTER — OFFICE VISIT (OUTPATIENT)
Dept: FAMILY MEDICINE | Facility: CLINIC | Age: 30
End: 2020-03-02
Payer: COMMERCIAL

## 2020-03-02 VITALS
DIASTOLIC BLOOD PRESSURE: 82 MMHG | BODY MASS INDEX: 49.08 KG/M2 | HEIGHT: 64 IN | WEIGHT: 287.5 LBS | HEART RATE: 110 BPM | TEMPERATURE: 97.1 F | SYSTOLIC BLOOD PRESSURE: 139 MMHG | OXYGEN SATURATION: 97 %

## 2020-03-02 DIAGNOSIS — E66.01 MORBID OBESITY (H): ICD-10-CM

## 2020-03-02 PROCEDURE — 99213 OFFICE O/P EST LOW 20 MIN: CPT | Performed by: FAMILY MEDICINE

## 2020-03-02 RX ORDER — TOPIRAMATE 25 MG/1
75 TABLET, FILM COATED ORAL AT BEDTIME
Qty: 90 TABLET | Refills: 2 | Status: SHIPPED | OUTPATIENT
Start: 2020-03-02 | End: 2020-05-29

## 2020-03-02 RX ORDER — PHENTERMINE HYDROCHLORIDE 30 MG/1
30 CAPSULE ORAL EVERY MORNING
Qty: 30 CAPSULE | Refills: 2 | Status: SHIPPED | OUTPATIENT
Start: 2020-03-02 | End: 2020-05-29

## 2020-03-02 ASSESSMENT — MIFFLIN-ST. JEOR: SCORE: 2024.09

## 2020-03-02 NOTE — PROGRESS NOTES
"Subjective     Jennifer Reyes is a 29 year old female who presents to clinic today for the following health issues:    HPI   Medication Followup of phentermine    Taking Medication as prescribed: yes    Side Effects:  Dry mouth, anxiety    Medication Helping Symptoms: yes     She continues to lose weight gradually.   Patient was started on medications for weight loss management.  She denies any complaints  other than occasional dry mouth.    Patient Active Problem List   Diagnosis     Morbid obesity (H)     No past surgical history on file.    Social History     Tobacco Use     Smoking status: Never Smoker     Smokeless tobacco: Never Used   Substance Use Topics     Alcohol use: Yes     Frequency: Monthly or less     Drinks per session: 1 or 2     Binge frequency: Never     Comment: Soc-wine beer mixed drink     Family History   Adopted: Yes           Reviewed and updated as needed this visit by Provider         Review of Systems   ROS COMP: Constitutional, HEENT, cardiovascular, pulmonary, gi and gu systems are negative, except as otherwise noted.      Objective    /82   Pulse 110   Temp 97.1  F (36.2  C) (Oral)   Ht 1.626 m (5' 4\")   Wt 130.4 kg (287 lb 8 oz)   LMP 02/25/2020   SpO2 97%   BMI 49.35 kg/m    Body mass index is 49.35 kg/m .  Physical Exam   GENERAL: healthy, alert and no distress  RESP: lungs clear to auscultation - no rales, rhonchi or wheezes  CV: regular rate and rhythm, normal S1 S2, no S3 or S4, no murmur, click or rub, no peripheral edema and peripheral pulses strong    Diagnostic Test Results:  Labs reviewed in Epic  No results found for any visits on 03/02/20.        Assessment & Plan       ICD-10-CM    1. Morbid obesity (H) E66.01 topiramate (TOPAMAX) 25 MG tablet     phentermine (ADIPEX-P) 30 MG capsule     Return to clinic in 3 months.   Patient will call after 1 month for more phentermine refills.     Return in about 3 months (around 6/2/2020).    Berna Valladares, " MD  [facility]

## 2020-03-02 NOTE — NURSING NOTE
"Chief Complaint   Patient presents with     Recheck Medication     /82   Pulse 110   Temp 97.1  F (36.2  C) (Oral)   Ht 1.626 m (5' 4\")   Wt 130.4 kg (287 lb 8 oz)   LMP 02/25/2020   SpO2 97%   BMI 49.35 kg/m   Estimated body mass index is 49.35 kg/m  as calculated from the following:    Height as of this encounter: 1.626 m (5' 4\").    Weight as of this encounter: 130.4 kg (287 lb 8 oz).  bp completed using cuff size: large      Health Maintenance addressed:  NONE    n/a    Alyson Jaimes MA    "

## 2020-05-27 DIAGNOSIS — E66.01 MORBID OBESITY (H): ICD-10-CM

## 2020-05-27 RX ORDER — PHENTERMINE HYDROCHLORIDE 30 MG/1
30 CAPSULE ORAL EVERY MORNING
Qty: 30 CAPSULE | Refills: 2 | Status: CANCELLED | OUTPATIENT
Start: 2020-05-27

## 2020-05-27 NOTE — TELEPHONE ENCOUNTER
Reason for Call:  Medication or medication refill:    Do you use a [facility]?  Name of the pharmacy and phone number for the current request: [id number] - [place], [place] - [address] [place] [address] [address]      Name of the medication requested: phentermine (ADIPEX-P) 30 MG capsule     Other request: None    Can we leave a detailed message on this number? YES    Phone number patient can be reached at: Home number on file [phone] (home)    Best Time: any    Call taken on 5/27/2020 at 4:13 PM by Daisy Moon

## 2020-05-29 ENCOUNTER — VIRTUAL VISIT (OUTPATIENT)
Dept: FAMILY MEDICINE | Facility: CLINIC | Age: 30
End: 2020-05-29
Payer: COMMERCIAL

## 2020-05-29 DIAGNOSIS — E66.01 MORBID OBESITY (H): ICD-10-CM

## 2020-05-29 PROCEDURE — 99213 OFFICE O/P EST LOW 20 MIN: CPT | Mod: TEL | Performed by: PHYSICIAN ASSISTANT

## 2020-05-29 RX ORDER — PHENTERMINE HYDROCHLORIDE 30 MG/1
30 CAPSULE ORAL EVERY MORNING
Qty: 30 CAPSULE | Refills: 2 | Status: SHIPPED | OUTPATIENT
Start: 2020-05-29 | End: 2020-07-01 | Stop reason: DRUGHIGH

## 2020-05-29 RX ORDER — TOPIRAMATE 25 MG/1
75 TABLET, FILM COATED ORAL AT BEDTIME
Qty: 90 TABLET | Refills: 2 | Status: SHIPPED | OUTPATIENT
Start: 2020-05-29 | End: 2020-06-29

## 2020-05-29 NOTE — NURSING NOTE
"Chief Complaint   Patient presents with     Medication Request     phentermine     initial There were no vitals taken for this visit. Estimated body mass index is 49.35 kg/m  as calculated from the following:    Height as of 3/2/20: 1.626 m (5' 4\").    Weight as of 3/2/20: 130.4 kg (287 lb 8 oz).  BP completed using cuff size: .  L  R arm      Health Maintenance that is potentially due pending provider review:      Raphael López ma  "

## 2020-05-29 NOTE — PROGRESS NOTES
"Jennifer Reyes is a 29 year old female who is being evaluated via a billable telephone visit.      The patient has been notified of following:     \"This telephone visit will be conducted via a call between you and your physician/provider. We have found that certain health care needs can be provided without the need for a physical exam.  This service lets us provide the care you need with a short phone conversation.  If a prescription is necessary we can send it directly to your pharmacy.  If lab work is needed we can place an order for that and you can then stop by our lab to have the test done at a later time.    Telephone visits are billed at different rates depending on your insurance coverage. During this emergency period, for some insurers they may be billed the same as an in-person visit.  Please reach out to your insurance provider with any questions.    If during the course of the call the physician/provider feels a telephone visit is not appropriate, you will not be charged for this service.\"    Patient has given verbal consent for Telephone visit?  Yes    What phone number would you like to be contacted at? [phone]    How would you like to obtain your AVS? Emory Wilson     Jennifer Reyes is a 29 year old female who presents via phone visit today for the following health issues:    HPI  Medication Followup of Phentermine    Taking Medication as prescribed: yes    Side Effects:  None    Medication Helping Symptoms:  yes        Has been on weight loss meds for about 1 year.  She initially had some success, a little plateau at March.  Since then, she has felt she has been having some more success.  She does not weight herself, but feels clothing fitting different.  She has been getting more exercise during pandemic with walks.  Feeling quite good.  Diet is stable.  Denies any side effects to meds, and they have not affected her BP in the past.    BP Readings from Last 3 Encounters:   03/02/20 " 139/82   12/02/19 (!) 142/92   08/30/19 137/84    Wt Readings from Last 3 Encounters:   03/02/20 130.4 kg (287 lb 8 oz)   12/02/19 129.6 kg (285 lb 11.2 oz)   08/30/19 132.5 kg (292 lb 3.2 oz)                    Reviewed and updated as needed this visit by Provider         Review of Systems   Constitutional, HEENT, cardiovascular, pulmonary, gi and gu systems are negative, except as otherwise noted.       Objective   Reported vitals:  There were no vitals taken for this visit.   alert and no distress  PSYCH: Alert and oriented times 3; coherent speech, normal   rate and volume, able to articulate logical thoughts, able   to abstract reason, no tangential thoughts, no hallucinations   or delusions  Her affect is normal  RESP: No cough, no audible wheezing, able to talk in full sentences  Remainder of exam unable to be completed due to telephone visits    Diagnostic Test Results:  Labs reviewed in Epic        Assessment/Plan:  1. Morbid obesity (H)  Will continue for another 3 months.  Would like f2f OV at that time to recheck weight, blood pressure and possibly labs  - phentermine (ADIPEX-P) 30 MG capsule; Take 1 capsule (30 mg) by mouth every morning  Dispense: 30 capsule; Refill: 2  - topiramate (TOPAMAX) 25 MG tablet; Take 3 tablets (75 mg) by mouth At Bedtime  Dispense: 90 tablet; Refill: 2    No follow-ups on file.      Phone call duration:  7 minutes    Corby Shirley PA-C

## 2020-06-29 ENCOUNTER — OFFICE VISIT (OUTPATIENT)
Dept: FAMILY MEDICINE | Facility: CLINIC | Age: 30
End: 2020-06-29
Payer: COMMERCIAL

## 2020-06-29 VITALS
BODY MASS INDEX: 48.36 KG/M2 | DIASTOLIC BLOOD PRESSURE: 73 MMHG | HEIGHT: 64 IN | RESPIRATION RATE: 16 BRPM | TEMPERATURE: 98.2 F | OXYGEN SATURATION: 99 % | HEART RATE: 72 BPM | SYSTOLIC BLOOD PRESSURE: 123 MMHG | WEIGHT: 283.3 LBS

## 2020-06-29 DIAGNOSIS — E66.01 MORBID OBESITY (H): Primary | ICD-10-CM

## 2020-06-29 LAB
ALBUMIN SERPL-MCNC: 3 G/DL (ref 3.4–5)
ALP SERPL-CCNC: 54 U/L (ref 40–150)
ALT SERPL W P-5'-P-CCNC: 18 U/L (ref 0–50)
ANION GAP SERPL CALCULATED.3IONS-SCNC: 6 MMOL/L (ref 3–14)
AST SERPL W P-5'-P-CCNC: 11 U/L (ref 0–45)
BILIRUB SERPL-MCNC: 0.2 MG/DL (ref 0.2–1.3)
BUN SERPL-MCNC: 9 MG/DL (ref 7–30)
CALCIUM SERPL-MCNC: 8.4 MG/DL (ref 8.5–10.1)
CHLORIDE SERPL-SCNC: 112 MMOL/L (ref 94–109)
CO2 SERPL-SCNC: 22 MMOL/L (ref 20–32)
CREAT SERPL-MCNC: 0.8 MG/DL (ref 0.52–1.04)
GFR SERPL CREATININE-BSD FRML MDRD: >90 ML/MIN/{1.73_M2}
GLUCOSE SERPL-MCNC: 98 MG/DL (ref 70–99)
HBA1C MFR BLD: 5.4 % (ref 0–5.6)
POTASSIUM SERPL-SCNC: 3.8 MMOL/L (ref 3.4–5.3)
PROT SERPL-MCNC: 7.4 G/DL (ref 6.8–8.8)
SODIUM SERPL-SCNC: 140 MMOL/L (ref 133–144)
TSH SERPL DL<=0.005 MIU/L-ACNC: 0.93 MU/L (ref 0.4–4)

## 2020-06-29 PROCEDURE — 99214 OFFICE O/P EST MOD 30 MIN: CPT | Performed by: NURSE PRACTITIONER

## 2020-06-29 PROCEDURE — 36415 COLL VENOUS BLD VENIPUNCTURE: CPT | Performed by: NURSE PRACTITIONER

## 2020-06-29 PROCEDURE — 80053 COMPREHEN METABOLIC PANEL: CPT | Performed by: NURSE PRACTITIONER

## 2020-06-29 PROCEDURE — 84443 ASSAY THYROID STIM HORMONE: CPT | Performed by: NURSE PRACTITIONER

## 2020-06-29 PROCEDURE — 83036 HEMOGLOBIN GLYCOSYLATED A1C: CPT | Performed by: NURSE PRACTITIONER

## 2020-06-29 RX ORDER — TOPIRAMATE 25 MG/1
75 TABLET, FILM COATED ORAL AT BEDTIME
Qty: 90 TABLET | Refills: 2 | Status: SHIPPED | OUTPATIENT
Start: 2020-06-29 | End: 2020-07-29

## 2020-06-29 RX ORDER — PHENTERMINE HYDROCHLORIDE 37.5 MG/1
37.5 CAPSULE ORAL EVERY MORNING
Qty: 30 CAPSULE | Refills: 0 | Status: SHIPPED | OUTPATIENT
Start: 2020-06-29 | End: 2020-07-29

## 2020-06-29 ASSESSMENT — MIFFLIN-ST. JEOR: SCORE: 2005.04

## 2020-06-29 NOTE — PATIENT INSTRUCTIONS
Patient Education              For informational purposes only. Not to replace the advice of your health care provider.  Copyright   2018 Health system. All rights reserved.    Will trial increased dose of Phentermine ( 37.5 mg) daily  Follow up in 1 month with Dr. Valladares

## 2020-06-29 NOTE — PROGRESS NOTES
"Subjective     Jennifer Reyes is a 29 year old female who presents to clinic today for the following health issues:    HPI   Medication Followup of Phentermine and Topamax    Taking Medication as prescribed: yes    Side Effects:  None    Medication Helping Symptoms:  As above , yes         Patient Active Problem List   Diagnosis     Morbid obesity (H)     History reviewed. No pertinent surgical history.    Social History     Tobacco Use     Smoking status: Never Smoker     Smokeless tobacco: Never Used   Substance Use Topics     Alcohol use: Yes     Frequency: Monthly or less     Drinks per session: 1 or 2     Binge frequency: Never     Comment: Soc-wine beer mixed drink     Family History   Adopted: Yes         Current Outpatient Medications   Medication Sig Dispense Refill     phentermine (ADIPEX-P) 30 MG capsule Take 1 capsule (30 mg) by mouth every morning 30 capsule 2     phentermine (ADIPEX-P) 37.5 MG capsule Take 1 capsule (37.5 mg) by mouth every morning 30 capsule 0     topiramate (TOPAMAX) 25 MG tablet Take 3 tablets (75 mg) by mouth At Bedtime 90 tablet 2     No Known Allergies      Reviewed and updated as needed this visit by Provider:    Initiated Topamax and Phentermine (30 mg) 1 year ago when weight was 308#  Patient states she has lost approx 23 pounds over the first 6-8 months but has leveled off over the past 3-6 months due to Pandemic consequences.  She is working from home and her gym was closed and has had added stress.     She is hopeful today reporting that her gym reopened ( 1 week ago) and plans to continue her daily 30\" walks.      She is interested in increasing dose of Phentermine       Review of Systems   Constitutional, HEENT, cardiovascular, pulmonary, gi and gu systems are negative, except as otherwise noted.      Objective    /73   Pulse 72   Temp 98.2  F (36.8  C) (Oral)   Resp 16   Ht 1.626 m (5' 4\")   Wt 128.5 kg (283 lb 4.8 oz)   SpO2 99%   BMI 48.63 kg/m    Body " mass index is 48.63 kg/m .  Physical Exam   GENERAL: healthy, alert and no distress  NECK: no adenopathy, no asymmetry, masses, or scars and thyroid normal to palpation  RESP: lungs clear to auscultation - no rales, rhonchi or wheezes  CV: regular rate and rhythm, normal S1 S2, no S3 or S4, no murmur, click or rub, no peripheral edema and peripheral pulses strong  ABDOMEN: soft, nontender, no hepatosplenomegaly, no masses and bowel sounds normal  MS: no gross musculoskeletal defects noted, no edema    Diagnostic Test Results:  Labs reviewed in Epic  Results for orders placed or performed in visit on 06/29/20 (from the past 24 hour(s))   Comprehensive metabolic panel (BMP + Alb, Alk Phos, ALT, AST, Total. Bili, TP)   Result Value Ref Range    Sodium 140 133 - 144 mmol/L    Potassium 3.8 3.4 - 5.3 mmol/L    Chloride 112 (H) 94 - 109 mmol/L    Carbon Dioxide 22 20 - 32 mmol/L    Anion Gap 6 3 - 14 mmol/L    Glucose 98 70 - 99 mg/dL    Urea Nitrogen 9 7 - 30 mg/dL    Creatinine 0.80 0.52 - 1.04 mg/dL    GFR Estimate >90 >60 mL/min/[1.73_m2]    GFR Estimate If Black >90 >60 mL/min/[1.73_m2]    Calcium 8.4 (L) 8.5 - 10.1 mg/dL    Bilirubin Total 0.2 0.2 - 1.3 mg/dL    Albumin 3.0 (L) 3.4 - 5.0 g/dL    Protein Total 7.4 6.8 - 8.8 g/dL    Alkaline Phosphatase 54 40 - 150 U/L    ALT 18 0 - 50 U/L    AST 11 0 - 45 U/L   Hemoglobin A1c   Result Value Ref Range    Hemoglobin A1C 5.4 0 - 5.6 %   TSH with free T4 reflex   Result Value Ref Range    TSH 0.93 0.40 - 4.00 mU/L           Assessment & Plan   Problem List Items Addressed This Visit     Morbid obesity (H) - Primary    Relevant Medications    phentermine (ADIPEX-P) 37.5 MG capsule    topiramate (TOPAMAX) 25 MG tablet    Other Relevant Orders    Comprehensive metabolic panel (BMP + Alb, Alk Phos, ALT, AST, Total. Bili, TP) (Completed)    Hemoglobin A1c (Completed)    TSH with free T4 reflex (Completed)         Will increase Phentermine to 37.5 mg daily.  If not tolerated  may go back to 30 mg daily.       Continue Regular exercise: Plans to get back into the gym on a regular basis   See Patient Instructions  Return in about 1 month (around 7/29/2020). with Dr. Valladares  May be a virtual visit)       30 minute visit with over 1/2 time spent with coordination of care and counseling.     PRUDENCIO Felix [facility]

## 2020-07-29 ENCOUNTER — OFFICE VISIT (OUTPATIENT)
Dept: FAMILY MEDICINE | Facility: CLINIC | Age: 30
End: 2020-07-29
Payer: COMMERCIAL

## 2020-07-29 VITALS
DIASTOLIC BLOOD PRESSURE: 89 MMHG | SYSTOLIC BLOOD PRESSURE: 125 MMHG | RESPIRATION RATE: 16 BRPM | OXYGEN SATURATION: 98 % | HEIGHT: 64 IN | BODY MASS INDEX: 48.32 KG/M2 | HEART RATE: 97 BPM | TEMPERATURE: 97.7 F | WEIGHT: 283 LBS

## 2020-07-29 DIAGNOSIS — E66.01 MORBID OBESITY (H): ICD-10-CM

## 2020-07-29 PROCEDURE — 99213 OFFICE O/P EST LOW 20 MIN: CPT | Performed by: FAMILY MEDICINE

## 2020-07-29 RX ORDER — PHENOL 1.4 %
10 AEROSOL, SPRAY (ML) MUCOUS MEMBRANE
COMMUNITY

## 2020-07-29 RX ORDER — PHENTERMINE HYDROCHLORIDE 37.5 MG/1
37.5 CAPSULE ORAL EVERY MORNING
Qty: 30 CAPSULE | Refills: 0 | Status: SHIPPED | OUTPATIENT
Start: 2020-07-29 | End: 2020-08-31

## 2020-07-29 RX ORDER — TOPIRAMATE 25 MG/1
75 TABLET, FILM COATED ORAL AT BEDTIME
Qty: 90 TABLET | Refills: 2 | Status: SHIPPED | OUTPATIENT
Start: 2020-07-29 | End: 2020-08-31

## 2020-07-29 ASSESSMENT — MIFFLIN-ST. JEOR: SCORE: 1998.68

## 2020-07-29 NOTE — LETTER
[facility]  07/29/20    Patient: Jennifer Reyes  YOB: 1990  Medical Record Number: [id number]  CSN: [id number]                                                                              Non-opioid Controlled Substance Agreement    I understand that my care provider has prescribed a controlled substance to help manage my condition(s). I am taking this medicine to help me function or work. I know this is strong medicine, and that it can cause serious side effects. Controlled substances can be sedating, addicting and may cause a dependency on the drug. They can affect my ability to drive or think, and cause depression. They need to be taken exactly as prescribed. Combining controlled substances with certain medicines or chemicals (such as cocaine, sedatives and tranquilizers, sleeping pills, meth) can be dangerous or even fatal. Also, if I stop controlled substances suddenly, I may have severe withdrawal symptoms.  If not helpful, I may be asked to stop them.    The risks, benefits, and side effects of these medicine(s) were explained to me. I agree that:    1. I will take part in other treatments as advised by my care team. This may be psychiatry or counseling, physical therapy, behavioral therapy, group treatment or a referral to a pain clinic. I will reduce or stop my medicine when my care team tells me to do so.  2. I will take my medicines as prescribed. I will not change the dose or schedule unless my care team tells me to. There will be no refills if I  run out early.   I may be contactedwithout warning and asked to complete a urine drug test or pill count at any time.   3. I will keep all my appointments, and understand this is part of the monitoring of controlled substances. My care team may require an office visit for EVERY controlled substance refill. If I miss appointments or don t follow instructions, my care team may stop my medicine.  4. I will not ask other providers to  prescribe controlled substances, and I will not accept controlled substances from other people. If I need another prescribed controlled substance for a new reason, I will tell my care team within 1 business day.  5. I will use one pharmacy to fill all of my controlled substance prescriptions, and it is up to me to make sure that I do not run out of my medicines on weekends or holidays. If my care team is willing to refill my controlled substance prescription without a visit, I must request refills only during office hours, refills may take up to 3 days to process, and it may take up to 5 to 7 days for my medicine to be mailed and ready at my pharmacy. Prescriptions will not be mailed anywhere except my pharmacy.    6. I am responsible for my prescriptions. If the medicine/prescription is lost or stolen, it will not be replaced. I also agree not to share controlled substance medicines with anyone.              [facility]  07/29/20  Patient:  Jennifer Reyes  YOB: 1990  Medical Record Number: [id number]  CSN: [id number]    7. I agree to not use ANY illegal or recreational drugs. This includes marijuana, cocaine, bath salts or other drugs. I agree not to use alcohol unless my care team says I may. I agree to give urine samples whenever asked. If I don t give a urine sample, the care team may stop my medicine.    8. If I enroll in the [place] Medical Marijuana program, I will tell my care team. I will also sign an agreement to share my medical records with my care team.    9. I will bring in my list of medicines (or my medicine bottles) each time I come to the clinic.   10. I will tell my care team right away if I become pregnant or have a new medical problem treated outside of my regular clinic.  11. I understand that this medicine can affect my thinking and judgment. It may be unsafe for me to drive, use machinery and do dangerous tasks. I will not do any of these things until I know how the  medicine affects me. If my dose changes, I will wait to see how it affects me. I will contact my care team if I have concerns about medicine side effects.    I understand that if I do not follow any of the conditions above, my prescriptions or treatment may be stopped.      I agree that my provider, clinic care team, and pharmacy may work with any city, state or federal law enforcement agency that investigates the misuse, sale, or other diversion of my controlled medicine. I will allow my provider to discuss my care with or share a copy of this agreement with any other treating provider, pharmacy or emergency room where I receive care. I agree to give up (waive) any right of privacy or confidentiality with respect to these consents.   I have read this agreement and have asked questions about anything I did not understand.    ____________________________________________________    ____________  ________  Patient signature                                                         Date      Time    ____________________________________________________     ____________  ________  Witness                                                          Date      Time    ____________________________________________________  Provider signature

## 2020-07-29 NOTE — PROGRESS NOTES
"Steve Reyes is a 30 year old female who presents to clinic today for the following health issues:    HPI       Medication Followup of Phentermine     Taking Medication as prescribed: yes    Side Effects:  None    Medication Helping Symptoms:  yes       Patient Active Problem List   Diagnosis     Morbid obesity (H)     History reviewed. No pertinent surgical history.    Social History     Tobacco Use     Smoking status: Never Smoker     Smokeless tobacco: Never Used   Substance Use Topics     Alcohol use: Yes     Frequency: Monthly or less     Drinks per session: 1 or 2     Binge frequency: Never     Comment: Soc-wine beer mixed drink     Family History   Adopted: Yes           Reviewed and updated as needed this visit by Provider         Review of Systems   Constitutional, HEENT, cardiovascular, pulmonary, gi and gu systems are negative, except as otherwise noted.      Objective    /89   Pulse 97   Temp 97.7  F (36.5  C) (Oral)   Resp 16   Ht 1.626 m (5' 4\")   Wt 128.4 kg (283 lb)   LMP 07/23/2020   SpO2 98%   BMI 48.58 kg/m    Body mass index is 48.58 kg/m .  Physical Exam   GENERAL: healthy, alert and no distress  RESP: lungs clear to auscultation - no rales, rhonchi or wheezes  CV: regular rate and rhythm, normal S1 S2, no S3 or S4, no murmur, click or rub, no peripheral edema and peripheral pulses strong    Diagnostic Test Results:  Labs reviewed in Epic        Assessment & Plan     1. Morbid obesity (H)  Assessment: excellent response to medication. Continues to lose weight. Starting weight was 309 lbs current weight is 283 lbs.   Plan:  - phentermine (ADIPEX-P) 37.5 MG capsule; Take 1 capsule (37.5 mg) by mouth every morning  Dispense: 30 capsule; Refill: 0  - topiramate (TOPAMAX) 25 MG tablet; Take 3 tablets (75 mg) by mouth At Bedtime  Dispense: 90 tablet; Refill: 2  - Virtual visit next month.     Return in about 3 months (around 10/29/2020) for Follow-up visit.    Berna" JOSÉ MIGUEL Valladares MD  [facility]

## 2020-08-31 ENCOUNTER — VIRTUAL VISIT (OUTPATIENT)
Dept: FAMILY MEDICINE | Facility: CLINIC | Age: 30
End: 2020-08-31
Payer: COMMERCIAL

## 2020-08-31 DIAGNOSIS — E66.01 MORBID OBESITY (H): ICD-10-CM

## 2020-08-31 PROCEDURE — 99213 OFFICE O/P EST LOW 20 MIN: CPT | Mod: GT | Performed by: FAMILY MEDICINE

## 2020-08-31 RX ORDER — TOPIRAMATE 25 MG/1
75 TABLET, FILM COATED ORAL AT BEDTIME
Qty: 90 TABLET | Refills: 2 | Status: SHIPPED | OUTPATIENT
Start: 2020-08-31 | End: 2020-09-28

## 2020-08-31 RX ORDER — PHENTERMINE HYDROCHLORIDE 37.5 MG/1
37.5 CAPSULE ORAL EVERY MORNING
Qty: 30 CAPSULE | Refills: 0 | Status: SHIPPED | OUTPATIENT
Start: 2020-08-31 | End: 2020-10-30

## 2020-08-31 NOTE — PROGRESS NOTES
"Jennifer Reyes is a 30 year old female who is being evaluated via a billable video visit.      The patient has been notified of following:     \"This video visit will be conducted via a call between you and your physician/provider. We have found that certain health care needs can be provided without the need for an in-person physical exam.  This service lets us provide the care you need with a video conversation.  If a prescription is necessary we can send it directly to your pharmacy.  If lab work is needed we can place an order for that and you can then stop by our lab to have the test done at a later time.    Video visits are billed at different rates depending on your insurance coverage.  Please reach out to your insurance provider with any questions.    If during the course of the call the physician/provider feels a video visit is not appropriate, you will not be charged for this service.\"    Patient has given verbal consent for Video visit? Yes  How would you like to obtain your AVS? MyChart  If you are dropped from the video visit, the video invite should be resent to:   Will anyone else be joining your video visit? No    Subjective     Jennifer Reyes is a 30 year old female who presents today via video visit for the following health issues:    HPI    Medication Followup of Topamax and phentermine    Taking Medication as prescribed: yes    Side Effects:  None    Medication Helping Symptoms:  yes       The patient had a follow-up visit for medication refills.  She is currently taking phentermine and Topamax for weight loss.  She is unsure about her current weight but states that she went shopping for D&C yesterday and went down a size.  She desires to continue with the same medication.  Denies any side effects.        Video Start Time: 9:18 AM      Review of Systems   Constitutional, HEENT, cardiovascular, pulmonary, gi and gu systems are negative, except as otherwise noted.      Objective     " "      Vitals:  No vitals were obtained today due to virtual visit.    Physical Exam     GENERAL: Healthy, alert and no distress  EYES: Eyes grossly normal to inspection.  No discharge or erythema, or obvious scleral/conjunctival abnormalities.  RESP: No audible wheeze, cough, or visible cyanosis.  No visible retractions or increased work of breathing.    SKIN: Visible skin clear. No significant rash, abnormal pigmentation or lesions.  NEURO: Cranial nerves grossly intact.  Mentation and speech appropriate for age.  PSYCH: Mentation appears normal, affect normal/bright, judgement and insight intact, normal speech and appearance well-groomed.      No results found for this or any previous visit (from the past 24 hour(s)).        Assessment & Plan     Morbid obesity (H)  Patient was advised to continue with the current dose.  Schedule a visit next month for medication refills.  - phentermine (ADIPEX-P) 37.5 MG capsule; Take 1 capsule (37.5 mg) by mouth every morning  - topiramate (TOPAMAX) 25 MG tablet; Take 3 tablets (75 mg) by mouth At Bedtime     BMI:   Estimated body mass index is 48.58 kg/m  as calculated from the following:    Height as of 7/29/20: 1.626 m (5' 4\").    Weight as of 7/29/20: 128.4 kg (283 lb).           MEDICATIONS:  Continue current medications without change    Return in about 1 month (around 9/30/2020) for Follow-up virtual visit.    Berna Valladares MD  [facility]      Video-Visit Details    Type of service:  Video Visit    Video End Time:9:23 AM    Originating Location (pt. Location): Home    Distant Location (provider location):  [facility]     Platform used for Video Visit: [employer]"

## 2020-09-28 ENCOUNTER — VIRTUAL VISIT (OUTPATIENT)
Dept: FAMILY MEDICINE | Facility: CLINIC | Age: 30
End: 2020-09-28
Payer: COMMERCIAL

## 2020-09-28 DIAGNOSIS — E66.01 MORBID OBESITY (H): ICD-10-CM

## 2020-09-28 PROCEDURE — 99213 OFFICE O/P EST LOW 20 MIN: CPT | Mod: GT | Performed by: FAMILY MEDICINE

## 2020-09-28 RX ORDER — TOPIRAMATE 25 MG/1
75 TABLET, FILM COATED ORAL AT BEDTIME
Qty: 90 TABLET | Refills: 2 | Status: SHIPPED | OUTPATIENT
Start: 2020-09-28 | End: 2021-09-10

## 2020-09-28 RX ORDER — PHENTERMINE HYDROCHLORIDE 37.5 MG/1
37.5 CAPSULE ORAL EVERY MORNING
Qty: 31 CAPSULE | Refills: 0 | Status: SHIPPED | OUTPATIENT
Start: 2020-09-28 | End: 2021-09-10

## 2020-09-28 NOTE — PROGRESS NOTES
"Jennifer Reyes is a 30 year old female who is being evaluated via a billable video visit.      The patient has been notified of following:     \"This video visit will be conducted via a call between you and your physician/provider. We have found that certain health care needs can be provided without the need for an in-person physical exam.  This service lets us provide the care you need with a video conversation.  If a prescription is necessary we can send it directly to your pharmacy.  If lab work is needed we can place an order for that and you can then stop by our lab to have the test done at a later time.    Video visits are billed at different rates depending on your insurance coverage.  Please reach out to your insurance provider with any questions.    If during the course of the call the physician/provider feels a video visit is not appropriate, you will not be charged for this service.\"    Patient has given verbal consent for Video visit? Yes  How would you like to obtain your AVS? MyChart  If you are dropped from the video visit, the video invite should be resent to:   Will anyone else be joining your video visit? No    Subjective     Jennifer Reyes is a 30 year old female who presents today via video visit for the following health issues:    HPI    Medication Followup of Phentermine and Topamax     Taking Medication as prescribed: yes    Side Effects:  None    Medication Helping Symptoms:  yes     The patient had a follow-up visit for medication refills.  She is currently taking phentermine and Topamax for weight loss.  She is unsure about her current weight.  She desires to continue with the same medication.  Denies any side effects.    Video Start Time: 9:24 AM      Review of Systems   Constitutional, HEENT, cardiovascular, pulmonary, gi and gu systems are negative, except as otherwise noted.      Objective           Vitals:  No vitals were obtained today due to virtual visit.    Physical Exam "     GENERAL: Healthy, alert and no distress  EYES: Eyes grossly normal to inspection.  No discharge or erythema, or obvious scleral/conjunctival abnormalities.  RESP: No audible wheeze, cough, or visible cyanosis.  No visible retractions or increased work of breathing.    SKIN: Visible skin clear. No significant rash, abnormal pigmentation or lesions.  NEURO: Cranial nerves grossly intact.  Mentation and speech appropriate for age.  PSYCH: Mentation appears normal, affect normal/bright, judgement and insight intact, normal speech and appearance well-groomed.      No results found for this or any previous visit (from the past 24 hour(s)).        Assessment & Plan   1. Morbid obesity (H)  - topiramate (TOPAMAX) 25 MG tablet; Take 3 tablets (75 mg) by mouth At Bedtime  Dispense: 90 tablet; Refill: 2  - phentermine (ADIPEX-P) 37.5 MG capsule; Take 1 capsule (37.5 mg) by mouth every morning  Dispense: 31 capsule; Refill: 0      Return in about 1 month (around 10/28/2020) for Follow-up visit.    Berna Valladares MD  [facility]      Video-Visit Details    Type of service:  Video Visit    Video End Time: 09:27 AM    Originating Location (pt. Location): Home    Distant Location (provider location):  [facility]     Platform used for Video Visit: [employer]

## 2020-10-30 ENCOUNTER — OFFICE VISIT (OUTPATIENT)
Dept: FAMILY MEDICINE | Facility: CLINIC | Age: 30
End: 2020-10-30
Payer: COMMERCIAL

## 2020-10-30 VITALS
SYSTOLIC BLOOD PRESSURE: 136 MMHG | TEMPERATURE: 97 F | WEIGHT: 293 LBS | DIASTOLIC BLOOD PRESSURE: 76 MMHG | HEIGHT: 64 IN | OXYGEN SATURATION: 98 % | BODY MASS INDEX: 50.02 KG/M2 | RESPIRATION RATE: 16 BRPM | HEART RATE: 85 BPM

## 2020-10-30 DIAGNOSIS — Z23 NEED FOR PROPHYLACTIC VACCINATION AND INOCULATION AGAINST INFLUENZA: ICD-10-CM

## 2020-10-30 DIAGNOSIS — E66.01 MORBID OBESITY (H): Primary | ICD-10-CM

## 2020-10-30 PROCEDURE — 90471 IMMUNIZATION ADMIN: CPT | Performed by: FAMILY MEDICINE

## 2020-10-30 PROCEDURE — 90686 IIV4 VACC NO PRSV 0.5 ML IM: CPT | Performed by: FAMILY MEDICINE

## 2020-10-30 PROCEDURE — 99213 OFFICE O/P EST LOW 20 MIN: CPT | Mod: 25 | Performed by: FAMILY MEDICINE

## 2020-10-30 ASSESSMENT — MIFFLIN-ST. JEOR: SCORE: 2044.04

## 2020-10-30 NOTE — PATIENT INSTRUCTIONS
Patient Education     Weight Management: Fact and Fiction    Knowing the truth about losing weight can help you separate what works from what doesn t. Don t be taken in by expensive weight-loss fads like pills, herbs, and special foods that promise unbelievable results. There s no magic way to lose weight. If you have questions about weight loss, ask your healthcare provider.   Fiction:  The faster I lose weight, the better.   Fact: Rapid weight loss is usually due to loss of water or muscle mass. What you re trying to get rid of is extra fat. Aim to lose a 1/2 pound to 2 pounds a week. Then you re more likely to lose fat rather than water or muscle.   Fiction:  Skipping meals will help me lose weight.   Fact: When you skip meals, you don t give your body the energy it needs to work. Hunger makes you more likely to overeat later on. It s best to spread your meals throughout the day. Eat at least three meals a day.   Fiction:  I can t start exercising until I lose weight.   Fact: The sooner you start exercising the better. Exercise helps burn more calories, tone your muscles, and keep your appetite in check. People who continue to exercise after they lose weight are more likely to keep the weight off.   Fiction:  The fewer calories I eat, the better.   Fact: This seems like it should be true, but it s not. When you eat too few calories, your body acts as if it s on a desert island. It thinks food is scarce, so it slows down your metabolism (how fast you burn calories) to save energy. By eating too few calories, you make it harder to lose weight.   Fiction:  Once I lose weight, I can go back to living the way I did before.   Fact: Going back to your old eating habits and giving up exercise is a sure way to regain any weight you ve lost. The lifestyle changes that help you lose extra weight can also help keep it off. This is why you need to make realistic changes you can stick with.   Fiction:  Low-fat and fat-free  mean low-calorie.   Fact: All foods, even fat-free ones, have calories. Eat too many calories and you ll gain weight. It s OK to treat yourself to a fat-free cookie or two. Just don t eat the whole box! A dietitian will help you figure this out, and will likely recommend that you eat three meals a day, with protein with each meal. Learn to read nutrition labels to see what you are really eating.   [employer] last reviewed this educational content on 4/1/2018 2000-2020 [employer]. [address] [place] [address]. All rights reserved. This information is not intended as a substitute for professional medical care. Always follow your healthcare professional's instructions.           Patient Education     Patient Education    Phentermine Hydrochloride Oral capsule    Phentermine Hydrochloride Oral capsule, extended-release    Phentermine Hydrochloride Oral disintegrating tablet    Phentermine Hydrochloride Oral tablet    Phentermine Resin Complex Oral capsule, extended-release  Phentermine Hydrochloride Oral tablet  What is this medicine?  PHENTERMINE (FEN ter meen) decreases your appetite. It is used with a reduced calorie diet and exercise to help you lose weight.  This medicine may be used for other purposes; ask your health care provider or pharmacist if you have questions.  What should I tell my health care provider before I take this medicine?  They need to know if you have any of these conditions:    agitation    glaucoma    heart disease    high blood pressure    history of substance abuse    lung disease called Primary Pulmonary Hypertension (PPH)    taken an MAOI like Carbex, Eldepryl, Marplan, Nardil, or Parnate in last 14 days    thyroid disease    an unusual or allergic reaction to phentermine, other medicines, foods, dyes, or preservatives    pregnant or trying to get pregnant    breast-feeding  How should I use this medicine?  Take this medicine by mouth with a glass of water.  Follow the directions on the prescription label. This medicine is usually taken 30 minutes before or 1 to 2 hours after breakfast. Avoid taking this medicine in the evening. It may interfere with sleep. Take your doses at regular intervals. Do not take your medicine more often than directed.  Talk to your pediatrician regarding the use of this medicine in children. Special care may be needed.  Overdosage: If you think you have taken too much of this medicine contact a poison control center or emergency room at once.  NOTE: This medicine is only for you. Do not share this medicine with others.  What if I miss a dose?  If you miss a dose, take it as soon as you can. If it is almost time for your next dose, take only that dose. Do not take double or extra doses.  What may interact with this medicine?  Do not take this medicine with any of the following medications:    duloxetine    MAOIs like Carbex, Eldepryl, Marplan, Nardil, and Parnate    medicines for colds or breathing difficulties like pseudoephedrine or phenylephrine    procarbazine    sibutramine    SSRIs like citalopram, escitalopram, fluoxetine, fluvoxamine, paroxetine, and sertraline    stimulants like dexmethylphenidate, methylphenidate or modafinil    venlafaxine  This medicine may also interact with the following medications:    medicines for diabetes  This list may not describe all possible interactions. Give your health care provider a list of all the medicines, herbs, non-prescription drugs, or dietary supplements you use. Also tell them if you smoke, drink alcohol, or use illegal drugs. Some items may interact with your medicine.  What should I watch for while using this medicine?  Notify your physician immediately if you become short of breath while doing your normal activities.  Do not take this medicine within 6 hours of bedtime. It can keep you from getting to sleep. Avoid drinks that contain caffeine and try to stick to a regular bedtime every  night.  This medicine was intended to be used in addition to a healthy diet and exercise. The best results are achieved this way. This medicine is only indicated for short-term use. Eventually your weight loss may level out. At that point, the drug will only help you maintain your new weight. Do not increase or in any way change your dose without consulting your doctor.  You may get drowsy or dizzy. Do not drive, use machinery, or do anything that needs mental alertness until you know how this medicine affects you. Do not stand or sit up quickly, especially if you are an older patient. This reduces the risk of dizzy or fainting spells. Alcohol may increase dizziness and drowsiness. Avoid alcoholic drinks.  What side effects may I notice from receiving this medicine?  Side effects that you should report to your doctor or health care professional as soon as possible:    chest pain, palpitations    depression or severe changes in mood    increased blood pressure    irritability    nervousness or restlessness    severe dizziness    shortness of breath    problems urinating    unusual swelling of the legs    vomiting  Side effects that usually do not require medical attention (report to your doctor or health care professional if they continue or are bothersome):    blurred vision or other eye problems    changes in sexual ability or desire    constipation or diarrhea    difficulty sleeping    dry mouth or unpleasant taste    headache    nausea  This list may not describe all possible side effects. Call your doctor for medical advice about side effects. You may report side effects to FDA at 7-794-FDA-4382.  Where should I keep my medicine?  Keep out of the reach of children. This medicine can be abused. Keep your medicine in a safe place to protect it from theft. Do not share this medicine with anyone. Selling or giving away this medicine is dangerous and against the law.  Store at room temperature between 20 and 25  degrees C (68 and 77 degrees F). Keep container tightly closed. Throw away any unused medicine after the expiration date.  NOTE:This sheet is a summary. It may not cover all possible information. If you have questions about this medicine, talk to your doctor, pharmacist, or health care provider. Copyright  2016 [employer]

## 2020-10-30 NOTE — PROGRESS NOTES
"Subjective     Jennifer Reyes is a 30 year old female who presents to clinic today for the following health issues:    HPI         Medication Followup of Phentermine     Taking Medication as prescribed: NO-pt states this past month she has not been takingmedication because of stressed at work and the world     Side Effects:  Yes dry mouth     Medication Helping Symptoms:  Yes, when taking the medication it was helping      Patient did not the phentermine.  Her blood pressure is mildly elevated today.  She did state that she noticed that the blood pressure cuff was too tight.  It might have been an incorrect cuff.  Initial blood pressure was 158/100.  Repeat blood pressure at the end of the visit with the correct cuff was 136/76.    Review of Systems   Constitutional, HEENT, cardiovascular, pulmonary, gi and gu systems are negative, except as otherwise noted.      Objective    BP (!) 158/100   Pulse 85   Temp 97  F (36.1  C) (Tympanic)   Resp 16   Ht 1.626 m (5' 4\")   Wt 132.9 kg (293 lb)   SpO2 98%   BMI 50.29 kg/m    Body mass index is 50.29 kg/m .  Physical Exam   GENERAL: healthy, alert and no distress  RESP: lungs clear to auscultation - no rales, rhonchi or wheezes  CV: regular rate and rhythm, normal S1 S2, no S3 or S4, no murmur, click or rub, no peripheral edema and peripheral pulses strong    No results found for any visits on 10/30/20.        Assessment & Plan     Morbid obesity (H)  Pleasant 30-year-old who presented to clinic for medical management of obesity.  She has been taking phentermine and Topamax for weight loss for the past 1 year.  Currently reports intermittent episodes of dry mouth.   The patient always had an excellent blood pressure.  Her blood pressure at the beginning of today's visit was 158/100.  She did state that the blood pressure cuff was too tight.  Repeat blood pressure at the end of the visit was 136/76.  Patient was advised to take a break from phentermine x1 month. "   She was also advised to check her blood pressure at home.   Patient is adopted so her family history is unknown.  Family history of cardiac disease or high blood pressure is unknown at this time.  - Home Blood Pressure Monitor Order for DME - ONLY FOR DME    Need for prophylactic vaccination and inoculation against influenza  - FLU VACCINE, 3 YRS +, IM (FLUZONE)      Return in about 1 month (around 11/30/2020) for Follow-up visit.    Berna Valladares MD  [facility]

## 2020-12-06 ENCOUNTER — HEALTH MAINTENANCE LETTER (OUTPATIENT)
Age: 30
End: 2020-12-06

## 2021-09-08 NOTE — PROGRESS NOTES
SUBJECTIVE:   CC: Jennifer Reyes is an 31 year old woman who presents for preventive health visit.     Patient has been advised of split billing requirements and indicates understanding: Yes  Healthy Habits:     Getting at least 3 servings of Calcium per day:  Yes    Bi-annual eye exam:  Yes    Dental care twice a year:  Yes    Sleep apnea or symptoms of sleep apnea:  None    Diet:  Regular (no restrictions)    Frequency of exercise:  2-3 days/week    Duration of exercise:  30-45 minutes    Taking medications regularly:  Yes    Medication side effects:  None    PHQ-2 Total Score: 0    Additional concerns today:  No    Patient is very emotional. Concerned about her weight. She is considering surgical weight management.     Patient in a same sex relationship. Not using anything for contraception.     Family history is unknown - patient is adopted.     Today's PHQ-2 Score:   PHQ-2 ( 1999 Pf[employer]) 9/10/2021   Q1: Little interest or pleasure in doing things 0   Q2: Feeling down, depressed or hopeless 0   PHQ-2 Score 0   Q1: Little interest or pleasure in doing things Not at all   Q2: Feeling down, depressed or hopeless Not at all   PHQ-2 Score 0       Abuse: Current or Past (Physical, Sexual or Emotional) - No  Do you feel safe in your environment? Yes    Have you ever done Advance Care Planning? (For example, a Health Directive, POLST, or a discussion with a medical provider or your loved ones about your wishes): No, advance care planning information given to patient to review.  Patient declined advance care planning discussion at this time.    Social History     Tobacco Use     Smoking status: Never Smoker     Smokeless tobacco: Never Used   Substance Use Topics     Alcohol use: Yes     Comment: Soc-wine beer mixed drink     If you drink alcohol do you typically have >3 drinks per day or >7 drinks per week? No    Alcohol Use 9/10/2021   Prescreen: >3 drinks/day or >7 drinks/week? No   Prescreen: >3 drinks/day or >7  "drinks/week? -       Reviewed orders with patient.  Reviewed health maintenance and updated orders accordingly - Yes  Lab work is in process    Breast Cancer Screening:  Any new diagnosis of family breast, ovarian, or bowel cancer? No    History of abnormal Pap smear: NO - age 30-65 PAP every 5 years with negative HPV co-testing recommended     Reviewed and updated as needed this visit by clinical staff  Tobacco  Allergies  Meds   Med Hx  Surg Hx  Fam Hx  Soc Hx        Reviewed and updated as needed this visit by Provider                Review of Systems   Constitutional: Negative for chills and fever.   HENT: Negative for congestion, ear pain, hearing loss and sore throat.    Eyes: Negative for pain and visual disturbance.   Respiratory: Negative for cough and shortness of breath.    Cardiovascular: Negative for chest pain, palpitations and peripheral edema.   Gastrointestinal: Negative for abdominal pain, constipation, diarrhea, heartburn, hematochezia and nausea.   Genitourinary: Negative for dysuria, frequency, genital sores, hematuria and urgency.   Musculoskeletal: Negative for arthralgias, joint swelling and myalgias.   Skin: Negative for rash.   Neurological: Negative for dizziness, weakness, headaches and paresthesias.   Psychiatric/Behavioral: Negative for mood changes. The patient is not nervous/anxious.        OBJECTIVE:   BP (!) 163/107   Pulse 110   Temp 97.9  F (36.6  C)   Ht 1.664 m (5' 5.5\")   Wt 141.2 kg (311 lb 4.8 oz)   SpO2 96%   BMI 51.02 kg/m    Physical Exam  GENERAL: healthy, alert and no distress  EYES: Eyes grossly normal to inspection, PERRL and conjunctivae and sclerae normal  HENT: ear canals and TM's normal, nose and mouth without ulcers or lesions  NECK: no adenopathy, no asymmetry, masses, or scars and thyroid normal to palpation  RESP: lungs clear to auscultation - no rales, rhonchi or wheezes  BREAST: normal without masses, tenderness or nipple discharge and no palpable " axillary masses or adenopathy  CV: regular rate and rhythm, normal S1 S2, no S3 or S4, no murmur, click or rub, no peripheral edema and peripheral pulses strong  ABDOMEN: soft, nontender, no hepatosplenomegaly, no masses and bowel sounds normal   (female): normal female external genitalia, normal urethral meatus, vaginal mucosa pink, moist, well rugated, and normal cervix/adnexa/uterus without masses or discharge  MS: no gross musculoskeletal defects noted, no edema  SKIN: no suspicious lesions or rashes  NEURO: Normal strength and tone, mentation intact and speech normal  PSYCH: mentation appears normal, affect normal/bright    Diagnostic Test Results:  Labs reviewed in Epic  No results found for any visits on 09/10/21.    ASSESSMENT/PLAN:       ICD-10-CM    1. Routine general medical examination at a health care facility  Z00.00 Comprehensive metabolic panel (BMP + Alb, Alk Phos, ALT, AST, Total. Bili, TP)     CBC with platelets     Lipid panel reflex to direct LDL Fasting     Hemoglobin A1c     Comprehensive metabolic panel (BMP + Alb, Alk Phos, ALT, AST, Total. Bili, TP)     CBC with platelets     Lipid panel reflex to direct LDL Fasting     Hemoglobin A1c   2. Morbid obesity (H)  E66.01 insulin pen needle (31G X 5 MM) 31G X 5 MM miscellaneous     Alcohol Swabs (ALCOHOL PREP) PADS     liraglutide - Weight Management (SAXENDA) 18 MG/3ML pen     Comprehensive Weight Management   3. Elevated blood pressure reading without diagnosis of hypertension  R03.0 NIFEdipine ER (ADALAT CC) 60 MG 24 hr tablet   4. Encounter for hepatitis C screening test for low risk patient  Z11.59 Hepatitis C antibody     Hepatitis C antibody   5. Screening for malignant neoplasm of cervix  Z12.4 Pap screen reflex to HPV if ASCUS - recommend age 25 - 29     Elevated blood pressure noted. Patient was started on a medication today. Advised her to schedule a follow-up visit in 2 weeks. Nurse only or a visit with a me.    Referred to our  "weight management clinic for bariatric surgery. Patient was started on Saxenda today to promote weight loss prior to surgery.          Patient has been advised of split billing requirements and indicates understanding: Yes  COUNSELING:  Reviewed preventive health counseling, as reflected in patient instructions    Estimated body mass index is 51.02 kg/m  as calculated from the following:    Height as of this encounter: 1.664 m (5' 5.5\").    Weight as of this encounter: 141.2 kg (311 lb 4.8 oz).    She reports that she has never smoked. She has never used smokeless tobacco.      Counseling Resources:  ATP IV Guidelines  Pooled Cohorts Equation Calculator  Breast Cancer Risk Calculator  BRCA-Related Cancer Risk Assessment: FHS-7 Tool  FRAX Risk Assessment  ICSI Preventive Guidelines  Dietary Guidelines for Americans, 2010  USDA's MyPlate  ASA Prophylaxis  Lung CA Screening    Berna Valladares MD  [facility]  "

## 2021-09-10 ENCOUNTER — OFFICE VISIT (OUTPATIENT)
Dept: FAMILY MEDICINE | Facility: CLINIC | Age: 31
End: 2021-09-10
Payer: COMMERCIAL

## 2021-09-10 VITALS
HEART RATE: 110 BPM | OXYGEN SATURATION: 96 % | SYSTOLIC BLOOD PRESSURE: 150 MMHG | HEIGHT: 66 IN | TEMPERATURE: 97.9 F | BODY MASS INDEX: 47.09 KG/M2 | WEIGHT: 293 LBS | DIASTOLIC BLOOD PRESSURE: 100 MMHG

## 2021-09-10 DIAGNOSIS — E66.01 MORBID OBESITY (H): ICD-10-CM

## 2021-09-10 DIAGNOSIS — Z11.59 ENCOUNTER FOR HEPATITIS C SCREENING TEST FOR LOW RISK PATIENT: ICD-10-CM

## 2021-09-10 DIAGNOSIS — Z12.4 SCREENING FOR MALIGNANT NEOPLASM OF CERVIX: ICD-10-CM

## 2021-09-10 DIAGNOSIS — Z00.00 ROUTINE GENERAL MEDICAL EXAMINATION AT A HEALTH CARE FACILITY: Primary | ICD-10-CM

## 2021-09-10 DIAGNOSIS — R03.0 ELEVATED BLOOD PRESSURE READING WITHOUT DIAGNOSIS OF HYPERTENSION: ICD-10-CM

## 2021-09-10 LAB
ERYTHROCYTE [DISTWIDTH] IN BLOOD BY AUTOMATED COUNT: 14.6 % (ref 10–15)
HBA1C MFR BLD: 5.6 % (ref 0–5.6)
HCT VFR BLD AUTO: 37.9 % (ref 35–47)
HCV AB SERPL QL IA: NONREACTIVE
HGB BLD-MCNC: 11.8 G/DL (ref 11.7–15.7)
MCH RBC QN AUTO: 25.6 PG (ref 26.5–33)
MCHC RBC AUTO-ENTMCNC: 31.1 G/DL (ref 31.5–36.5)
MCV RBC AUTO: 82 FL (ref 78–100)
PLATELET # BLD AUTO: 476 10E3/UL (ref 150–450)
RBC # BLD AUTO: 4.61 10E6/UL (ref 3.8–5.2)
WBC # BLD AUTO: 13.6 10E3/UL (ref 4–11)

## 2021-09-10 PROCEDURE — 99395 PREV VISIT EST AGE 18-39: CPT | Performed by: FAMILY MEDICINE

## 2021-09-10 PROCEDURE — 80053 COMPREHEN METABOLIC PANEL: CPT | Performed by: FAMILY MEDICINE

## 2021-09-10 PROCEDURE — 86803 HEPATITIS C AB TEST: CPT | Performed by: FAMILY MEDICINE

## 2021-09-10 PROCEDURE — 36415 COLL VENOUS BLD VENIPUNCTURE: CPT | Performed by: FAMILY MEDICINE

## 2021-09-10 PROCEDURE — 80061 LIPID PANEL: CPT | Performed by: FAMILY MEDICINE

## 2021-09-10 PROCEDURE — 99213 OFFICE O/P EST LOW 20 MIN: CPT | Mod: 25 | Performed by: FAMILY MEDICINE

## 2021-09-10 PROCEDURE — 85027 COMPLETE CBC AUTOMATED: CPT | Performed by: FAMILY MEDICINE

## 2021-09-10 PROCEDURE — G0145 SCR C/V CYTO,THINLAYER,RESCR: HCPCS | Performed by: FAMILY MEDICINE

## 2021-09-10 PROCEDURE — 83036 HEMOGLOBIN GLYCOSYLATED A1C: CPT | Performed by: FAMILY MEDICINE

## 2021-09-10 RX ORDER — PEN NEEDLE, DIABETIC 31 GX5/16"
1 NEEDLE, DISPOSABLE MISCELLANEOUS DAILY
Qty: 100 EACH | Refills: 0 | Status: SHIPPED | OUTPATIENT
Start: 2021-09-10 | End: 2023-02-10

## 2021-09-10 ASSESSMENT — ENCOUNTER SYMPTOMS
DIZZINESS: 0
HEMATURIA: 0
FREQUENCY: 0
ARTHRALGIAS: 0
NERVOUS/ANXIOUS: 0
NAUSEA: 0
CHILLS: 0
HEMATOCHEZIA: 0
JOINT SWELLING: 0
WEAKNESS: 0
SHORTNESS OF BREATH: 0
PALPITATIONS: 0
MYALGIAS: 0
HEADACHES: 0
DYSURIA: 0
ABDOMINAL PAIN: 0
CONSTIPATION: 0
PARESTHESIAS: 0
DIARRHEA: 0
COUGH: 0
FEVER: 0
EYE PAIN: 0
HEARTBURN: 0
SORE THROAT: 0

## 2021-09-10 ASSESSMENT — MIFFLIN-ST. JEOR: SCORE: 2145.86

## 2021-09-11 LAB
ALBUMIN SERPL-MCNC: 3 G/DL (ref 3.4–5)
ALP SERPL-CCNC: 56 U/L (ref 40–150)
ALT SERPL W P-5'-P-CCNC: 19 U/L (ref 0–50)
ANION GAP SERPL CALCULATED.3IONS-SCNC: 6 MMOL/L (ref 3–14)
AST SERPL W P-5'-P-CCNC: 15 U/L (ref 0–45)
BILIRUB SERPL-MCNC: 0.2 MG/DL (ref 0.2–1.3)
BUN SERPL-MCNC: 12 MG/DL (ref 7–30)
CALCIUM SERPL-MCNC: 8.7 MG/DL (ref 8.5–10.1)
CHLORIDE BLD-SCNC: 108 MMOL/L (ref 94–109)
CHOLEST SERPL-MCNC: 163 MG/DL
CO2 SERPL-SCNC: 24 MMOL/L (ref 20–32)
CREAT SERPL-MCNC: 0.71 MG/DL (ref 0.52–1.04)
FASTING STATUS PATIENT QL REPORTED: NO
GFR SERPL CREATININE-BSD FRML MDRD: >90 ML/MIN/1.73M2
GLUCOSE BLD-MCNC: 82 MG/DL (ref 70–99)
HDLC SERPL-MCNC: 33 MG/DL
LDLC SERPL CALC-MCNC: 93 MG/DL
NONHDLC SERPL-MCNC: 130 MG/DL
POTASSIUM BLD-SCNC: 4 MMOL/L (ref 3.4–5.3)
PROT SERPL-MCNC: 7.5 G/DL (ref 6.8–8.8)
SODIUM SERPL-SCNC: 138 MMOL/L (ref 133–144)
TRIGL SERPL-MCNC: 184 MG/DL

## 2021-09-14 LAB
BKR LAB AP GYN ADEQUACY: NORMAL
BKR LAB AP GYN INTERPRETATION: NORMAL
BKR LAB AP HPV REFLEX: NORMAL
BKR LAB AP PREVIOUS ABNORMAL: NORMAL
PATH REPORT.COMMENTS IMP SPEC: NORMAL
PATH REPORT.RELEVANT HX SPEC: NORMAL

## 2021-09-17 NOTE — RESULT ENCOUNTER NOTE
Dear Jennifer,   Here are your recent results.   Your complete blood count is slightly abnormal. Your white blood cells and platelets are elevated. This can be caused by acute vital infections or mild dehydration. I would recommend repeating your blood work in 2 weeks. Please schedule a lab only visit when you can.     Also, HDL(good) cholesterol level is low and your triglycerides are elevated which can increase your heart disease risk.  A diet high in fat and simple carbohydrates, genetics and being overweight can contribute to this. LDL(bad) cholesterol level is normal.  ADVISE:exercising 150 minutes of aerobic exercise per week (30 minutes 5 days per week or 50 minutes 3 days per week are options), and omega-3 fatty acids (fish oil) 0389-9032 mg daily are helpful to improve this.  In 6 months, you should recheck your fasting cholesterol panel by scheduling a lab-only appointment.    Best Regards   Berna Valladares MD

## 2021-09-20 DIAGNOSIS — D72.829 LEUKOCYTOSIS, UNSPECIFIED TYPE: Primary | ICD-10-CM

## 2021-09-24 ENCOUNTER — LAB (OUTPATIENT)
Dept: LAB | Facility: CLINIC | Age: 31
End: 2021-09-24

## 2021-09-24 ENCOUNTER — ALLIED HEALTH/NURSE VISIT (OUTPATIENT)
Dept: FAMILY MEDICINE | Facility: CLINIC | Age: 31
End: 2021-09-24
Payer: COMMERCIAL

## 2021-09-24 VITALS — DIASTOLIC BLOOD PRESSURE: 80 MMHG | SYSTOLIC BLOOD PRESSURE: 140 MMHG

## 2021-09-24 DIAGNOSIS — D72.829 LEUKOCYTOSIS, UNSPECIFIED TYPE: ICD-10-CM

## 2021-09-24 DIAGNOSIS — R03.0 ELEVATED BLOOD PRESSURE READING WITHOUT DIAGNOSIS OF HYPERTENSION: Primary | ICD-10-CM

## 2021-09-24 DIAGNOSIS — D72.829 LEUKOCYTOSIS, UNSPECIFIED TYPE: Primary | ICD-10-CM

## 2021-09-24 LAB
BASOPHILS # BLD AUTO: 0 10E3/UL (ref 0–0.2)
BASOPHILS NFR BLD AUTO: 0 %
EOSINOPHIL # BLD AUTO: 0.3 10E3/UL (ref 0–0.7)
EOSINOPHIL NFR BLD AUTO: 3 %
ERYTHROCYTE [DISTWIDTH] IN BLOOD BY AUTOMATED COUNT: 14.5 % (ref 10–15)
HCT VFR BLD AUTO: 39.1 % (ref 35–47)
HGB BLD-MCNC: 12 G/DL (ref 11.7–15.7)
LYMPHOCYTES # BLD AUTO: 3.6 10E3/UL (ref 0.8–5.3)
LYMPHOCYTES NFR BLD AUTO: 29 %
MCH RBC QN AUTO: 25.4 PG (ref 26.5–33)
MCHC RBC AUTO-ENTMCNC: 30.7 G/DL (ref 31.5–36.5)
MCV RBC AUTO: 83 FL (ref 78–100)
MONOCYTES # BLD AUTO: 0.8 10E3/UL (ref 0–1.3)
MONOCYTES NFR BLD AUTO: 6 %
NEUTROPHILS # BLD AUTO: 7.8 10E3/UL (ref 1.6–8.3)
NEUTROPHILS NFR BLD AUTO: 62 %
PLATELET # BLD AUTO: 471 10E3/UL (ref 150–450)
RBC # BLD AUTO: 4.73 10E6/UL (ref 3.8–5.2)
WBC # BLD AUTO: 12.6 10E3/UL (ref 4–11)

## 2021-09-24 PROCEDURE — 85025 COMPLETE CBC W/AUTO DIFF WBC: CPT

## 2021-09-24 PROCEDURE — 99207 PR NO CHARGE NURSE ONLY: CPT

## 2021-09-24 PROCEDURE — 36415 COLL VENOUS BLD VENIPUNCTURE: CPT

## 2021-09-24 NOTE — RESULT ENCOUNTER NOTE
Dear Jennifer,   Here are your recent results. Your white blood cell count improved slightly but it continues to be above the upper limit. Because your differentials (Neutrophils, lymphocytes, monocytes, eosinophils and basophils) were normal. There is no need for additional work-up at this time.     Let's continue to monitor it once every 3 months. I placed a standing order for CBC in your chart please schedule a lab only visit in 3 months.     Best Regards   Berna Valladares MD

## 2021-09-25 ENCOUNTER — HEALTH MAINTENANCE LETTER (OUTPATIENT)
Age: 31
End: 2021-09-25

## 2021-11-19 NOTE — PROGRESS NOTES
Jennifer is a 31 year old who is being evaluated via a billable video visit.      How would you like to obtain your AVS? [facility]  If the video visit is dropped, the invitation should be resent by: [facility] or else Text to cell phone: [phone]  Will anyone else be joining your video visit? No    Video Start Time: 11:40 AM    Assessment & Plan     Anxiety  Worsening anxiety. Patient was referred for counseling and started buspar today. Will consider starting Zoloft in the future.   - MENTAL HEALTH REFERRAL  - Adult; Outpatient Treatment; Individual/Couples/Family/Group Therapy/Health Psychology; [facility] [phone]; We will contact you to schedule the appointment or please call with any questions; Future  - busPIRone (BUSPAR) 5 MG tablet; 5mg TWICE DAILY x 3 days, 10mg in the AM and 5mg in the PM x 3 days, THEN 10mg in the AM and 10 mg in the PM.    Morbid obesity (H)  Desires to lose weight. Significant weight loss with phentermine in the past.   - phentermine (ADIPEX-P) 37.5 MG capsule; Take 1 capsule (37.5 mg) by mouth every morning  - topiramate (TOPAMAX) 25 MG tablet; Take 1 tablet (25 mg) by mouth At Bedtime for 7 days, THEN 2 tablets (50 mg) At Bedtime for 7 days, THEN 3 tablets (75 mg) At Bedtime for 16 days.    Return in about 1 month (around 12/22/2021) for Follow-up visit for weight loss, Follow-up for Mental Health.    Berna Valladares MD  [facility]   Jennifer is a 31 year old who presents for the following health issues    Pt would like to restart phentermine, does not have recent weight.       History of Present Illness       Mental Health Follow-up:  Patient presents to follow-up on Anxiety.    Patient's anxiety since last visit has been:  Medium  The patient is not having other symptoms associated with anxiety.  Any significant life events: No  Patient is not feeling anxious or having panic attacks.  Patient has no concerns about alcohol or  drug use.     Social History  Tobacco Use    Smoking status: Never Smoker    Smokeless tobacco: Never Used  Alcohol use: Yes    Comment: Soc-wine beer mixed drink  Drug use: Never      Today's PHQ-9         PHQ-9 Total Score:         PHQ-9 Q9 Thoughts of better off dead/self-harm past 2 weeks :       Thoughts of suicide or self harm:      Self-harm Plan:        Self-harm Action:          Safety concerns for self or others:           She eats 0-1 servings of fruits and vegetables daily.She consumes 1 sweetened beverage(s) daily.She exercises with enough effort to increase her heart rate 10 to 19 minutes per day.  She exercises with enough effort to increase her heart rate 3 or less days per week.   She is taking medications regularly.     Feeling anxious to the point that she took time off work to focus on her mental health.   Patient is a  and deals with other people's problems too.     Taking melatonin at night to help with sleep.      ESDRAS-7 SCORE 11/22/2021   Total Score 12 (moderate anxiety)   Total Score 12       Review of Systems   Constitutional, HEENT, cardiovascular, pulmonary, gi and gu systems are negative, except as otherwise noted.      Objective           Vitals:  No vitals were obtained today due to virtual visit.    Physical Exam   GENERAL: Healthy, alert and no distress  EYES: Eyes grossly normal to inspection.  No discharge or erythema, or obvious scleral/conjunctival abnormalities.  RESP: No audible wheeze, cough, or visible cyanosis.  No visible retractions or increased work of breathing.    SKIN: Visible skin clear. No significant rash, abnormal pigmentation or lesions.  NEURO: Cranial nerves grossly intact.  Mentation and speech appropriate for age.  PSYCH: Mentation appears normal, affect normal/bright, judgement and insight intact, normal speech and appearance well-groomed.    No results found for any visits on 11/22/21.          Video-Visit Details    Type of service:  Video  Visit    Video End Time:11:59 AM    Originating Location (pt. Location): Home    Distant Location (provider location):  [facility]     Platform used for Video Visit: JaninaWell  Answers for HPI/ROS submitted by the patient on 11/22/2021  ESDRAS 7 TOTAL SCORE: 12

## 2021-11-22 ENCOUNTER — VIRTUAL VISIT (OUTPATIENT)
Dept: FAMILY MEDICINE | Facility: CLINIC | Age: 31
End: 2021-11-22
Payer: COMMERCIAL

## 2021-11-22 DIAGNOSIS — E66.01 MORBID OBESITY (H): ICD-10-CM

## 2021-11-22 DIAGNOSIS — F41.9 ANXIETY: Primary | ICD-10-CM

## 2021-11-22 PROCEDURE — 99214 OFFICE O/P EST MOD 30 MIN: CPT | Mod: GT | Performed by: FAMILY MEDICINE

## 2021-11-22 RX ORDER — BUSPIRONE HYDROCHLORIDE 5 MG/1
TABLET ORAL
Qty: 120 TABLET | Refills: 0 | Status: SHIPPED | OUTPATIENT
Start: 2021-11-22 | End: 2022-01-17

## 2021-11-22 RX ORDER — TOPIRAMATE 25 MG/1
TABLET, FILM COATED ORAL
Qty: 90 TABLET | Refills: 2 | Status: SHIPPED | OUTPATIENT
Start: 2021-11-22 | End: 2022-01-17

## 2021-11-22 RX ORDER — PHENTERMINE HYDROCHLORIDE 37.5 MG/1
37.5 CAPSULE ORAL EVERY MORNING
Qty: 30 CAPSULE | Refills: 3 | Status: SHIPPED | OUTPATIENT
Start: 2021-11-22 | End: 2022-01-17

## 2021-11-22 ASSESSMENT — ANXIETY QUESTIONNAIRES
7. FEELING AFRAID AS IF SOMETHING AWFUL MIGHT HAPPEN: SEVERAL DAYS
5. BEING SO RESTLESS THAT IT IS HARD TO SIT STILL: SEVERAL DAYS
1. FEELING NERVOUS, ANXIOUS, OR ON EDGE: NEARLY EVERY DAY
7. FEELING AFRAID AS IF SOMETHING AWFUL MIGHT HAPPEN: SEVERAL DAYS
3. WORRYING TOO MUCH ABOUT DIFFERENT THINGS: NEARLY EVERY DAY
4. TROUBLE RELAXING: SEVERAL DAYS
GAD7 TOTAL SCORE: 12
8. IF YOU CHECKED OFF ANY PROBLEMS, HOW DIFFICULT HAVE THESE MADE IT FOR YOU TO DO YOUR WORK, TAKE CARE OF THINGS AT HOME, OR GET ALONG WITH OTHER PEOPLE?: SOMEWHAT DIFFICULT
GAD7 TOTAL SCORE: 12
6. BECOMING EASILY ANNOYED OR IRRITABLE: NOT AT ALL
2. NOT BEING ABLE TO STOP OR CONTROL WORRYING: NEARLY EVERY DAY
GAD7 TOTAL SCORE: 12

## 2021-11-22 NOTE — PATIENT INSTRUCTIONS
Patient Education     Buspar Oral Tablet 5 mg  Uses  For anxiety.  Instructions  This medicine may be taken with or without food.  It is very important that you take the medicine at about the same time every day. It will work best if you do this.  Keep the medicine at room temperature. Avoid heat and direct light.  Avoid grapefruit juice while on this medicine.  It is important that you keep taking each dose of this medicine on time even if you are feeling well.  If you forget to take a dose on time, take it as soon as you remember. If it is almost time for the next dose, do not take the missed dose. Return to your normal dosing schedule. Do not take 2 doses of this medicine at one time.  Please tell your doctor and pharmacist about all the medicines you take. Include both prescription and over-the-counter medicines. Also tell them about any vitamins, herbal medicines, or anything else you take for your health.  Cautions  Tell your doctor and pharmacist if you ever had an allergic reaction to a medicine. Symptoms of an allergic reaction can include trouble breathing, skin rash, itching, swelling, or severe dizziness.  Do not use the medication any more than instructed.  Your ability to stay alert or to react quickly may be impaired by this medicine. Do not drive or operate machinery until you know how this medicine will affect you.  Do not drink beverages with alcohol while on this medicine.  Tell the doctor or pharmacist if you are pregnant, planning to be pregnant, or breastfeeding.  Ask your pharmacist if this medicine can interact with any of your other medicines. Be sure to tell them about all the medicines you take.  Do not start or stop any other medicines without first speaking to your doctor or pharmacist.  Call your doctor right away if you notice any unusual bleeding or bruising.  Do not share this medicine with anyone who has not been prescribed this medicine.  Side Effects  The following is a list of  some common side effects from this medicine. Please speak with your doctor about what you should do if you experience these or other side effects.    agitated feeling or trouble sleeping    blurry vision    dizziness    drowsiness or sedation    lack of energy and tiredness    headaches    nausea    skin irritation such as redness, itching, rash, or burning  Call your doctor or get medical help right away if you notice any of these more serious side effects:    chest pain    face appearing mask-like    fast or irregular heart beats    uncontrollable movement of face, tongue, arms or legs    muscle aches, spasms or abnormal movements    tight or rigid muscles  A few people may have an allergic reactions to this medicine. Symptoms can include difficulty breathing, skin rash, itching, swelling, or severe dizziness. If you notice any of these symptoms, seek medical help quickly.  Extra  Please speak with your doctor, nurse, or pharmacist if you have any questions about this medicine.  [email or web address]  IMPORTANT NOTE: This document tells you briefly how to take your medicine, but it does not tell you all there is to know about it.Your doctor or pharmacist may give you other documents about your medicine. Please talk to them if you have any questions.Always follow their advice. There is a more complete description of this medicine available in English.Scan this code on your smartphone or tablet or use the web address below. You can also ask your pharmacist for a printout. If you have any questions, please ask your pharmacist.     2021 [employer].

## 2021-11-23 ASSESSMENT — ANXIETY QUESTIONNAIRES: GAD7 TOTAL SCORE: 12

## 2022-01-17 ENCOUNTER — OFFICE VISIT (OUTPATIENT)
Dept: FAMILY MEDICINE | Facility: CLINIC | Age: 32
End: 2022-01-17
Payer: COMMERCIAL

## 2022-01-17 VITALS
OXYGEN SATURATION: 100 % | TEMPERATURE: 97.3 F | WEIGHT: 293 LBS | HEART RATE: 105 BPM | DIASTOLIC BLOOD PRESSURE: 83 MMHG | SYSTOLIC BLOOD PRESSURE: 128 MMHG | BODY MASS INDEX: 49.61 KG/M2

## 2022-01-17 DIAGNOSIS — F41.9 ANXIETY: ICD-10-CM

## 2022-01-17 DIAGNOSIS — E66.01 MORBID OBESITY (H): Primary | ICD-10-CM

## 2022-01-17 DIAGNOSIS — Z23 HIGH PRIORITY FOR 2019-NCOV VACCINE: ICD-10-CM

## 2022-01-17 PROCEDURE — 0064A COVID-19,PF,MODERNA (18+ YRS BOOSTER .25ML): CPT | Performed by: FAMILY MEDICINE

## 2022-01-17 PROCEDURE — 96127 BRIEF EMOTIONAL/BEHAV ASSMT: CPT | Performed by: FAMILY MEDICINE

## 2022-01-17 PROCEDURE — 99214 OFFICE O/P EST MOD 30 MIN: CPT | Performed by: FAMILY MEDICINE

## 2022-01-17 PROCEDURE — 91306 COVID-19,PF,MODERNA (18+ YRS BOOSTER .25ML): CPT | Performed by: FAMILY MEDICINE

## 2022-01-17 RX ORDER — HYDROXYZINE HYDROCHLORIDE 25 MG/1
25 TABLET, FILM COATED ORAL 3 TIMES DAILY PRN
Qty: 60 TABLET | Refills: 0 | Status: SHIPPED | OUTPATIENT
Start: 2022-01-17 | End: 2022-03-15

## 2022-01-17 RX ORDER — PHENTERMINE HYDROCHLORIDE 37.5 MG/1
37.5 CAPSULE ORAL EVERY MORNING
Qty: 30 CAPSULE | Refills: 3 | Status: SHIPPED | OUTPATIENT
Start: 2022-01-17 | End: 2023-02-10

## 2022-01-17 RX ORDER — TOPIRAMATE 25 MG/1
75 TABLET, FILM COATED ORAL AT BEDTIME
Qty: 90 TABLET | Refills: 2 | Status: SHIPPED | OUTPATIENT
Start: 2022-01-17 | End: 2023-02-10

## 2022-01-17 ASSESSMENT — PATIENT HEALTH QUESTIONNAIRE - PHQ9
SUM OF ALL RESPONSES TO PHQ QUESTIONS 1-9: 1
10. IF YOU CHECKED OFF ANY PROBLEMS, HOW DIFFICULT HAVE THESE PROBLEMS MADE IT FOR YOU TO DO YOUR WORK, TAKE CARE OF THINGS AT HOME, OR GET ALONG WITH OTHER PEOPLE: NOT DIFFICULT AT ALL
SUM OF ALL RESPONSES TO PHQ QUESTIONS 1-9: 1

## 2022-01-17 ASSESSMENT — ANXIETY QUESTIONNAIRES
2. NOT BEING ABLE TO STOP OR CONTROL WORRYING: SEVERAL DAYS
GAD7 TOTAL SCORE: 6
7. FEELING AFRAID AS IF SOMETHING AWFUL MIGHT HAPPEN: SEVERAL DAYS
6. BECOMING EASILY ANNOYED OR IRRITABLE: NOT AT ALL
7. FEELING AFRAID AS IF SOMETHING AWFUL MIGHT HAPPEN: SEVERAL DAYS
1. FEELING NERVOUS, ANXIOUS, OR ON EDGE: SEVERAL DAYS
4. TROUBLE RELAXING: SEVERAL DAYS
GAD7 TOTAL SCORE: 6
5. BEING SO RESTLESS THAT IT IS HARD TO SIT STILL: SEVERAL DAYS
GAD7 TOTAL SCORE: 6
3. WORRYING TOO MUCH ABOUT DIFFERENT THINGS: SEVERAL DAYS

## 2022-01-17 NOTE — PROGRESS NOTES
Assessment & Plan     Morbid obesity (H)  Patient continues to lose weight as expected. Continue with phentermine and Topamax.   - phentermine (ADIPEX-P) 37.5 MG capsule; Take 1 capsule (37.5 mg) by mouth every morning  - topiramate (TOPAMAX) 25 MG tablet; Take 3 tablets (75 mg) by mouth At Bedtime  - follow-up in 2-3 months    Anxiety  Significant provement in her anxiety.  Patient was not able to tolerate the BuSpar.  She is given a prescription of hydroxyzine today.  - EMOTIONAL / BEHAVIORAL ASSESSMENT  - hydrOXYzine (ATARAX) 25 MG tablet; Take 1 tablet (25 mg) by mouth 3 times daily as needed for anxiety    High priority for 2019-nCoV vaccine  - COVID-19,PF,MODERNA (18+ Yrs BOOSTER .25mL)    Return in about 2 months (around 3/17/2022).    Berna Valladares MD  [facility]   Jennifer is a 31 year old who presents for the following health issues :    History of Present Illness       Mental Health Follow-up:  Patient presents to follow-up on Depression & Anxiety.Patient's depression since last visit has been:  Better  The patient is not having other symptoms associated with depression.  Patient's anxiety since last visit has been:  Better  The patient is not having other symptoms associated with anxiety.  Any significant life events: grief or loss  Patient is feeling anxious or having panic attacks.  Patient has no concerns about alcohol or drug use.     Social History  Tobacco Use    Smoking status: Never Smoker    Smokeless tobacco: Never Used  Vaping Use    Vaping Use: Never used  Alcohol use: Yes    Comment: Soc-wine beer mixed drink  Drug use: Never      Today's PHQ-9         PHQ-9 Total Score:     (P) 1   PHQ-9 Q9 Thoughts of better off dead/self-harm past 2 weeks :   (P) Not at all   Thoughts of suicide or self harm:      Self-harm Plan:        Self-harm Action:          Safety concerns for self or others:          Weight Loss Management      ESDRAS-7 SCORE 11/22/2021 1/17/2022    Total Score 12 (moderate anxiety) 6 (mild anxiety)   Total Score 12 6       Answers for HPI/ROS submitted by the patient on 1/17/2022  If you checked off any problems, how difficult have these problems made it for you to do your work, take care of things at home, or get along with other people?: Not difficult at all  PHQ9 TOTAL SCORE: 1  ESDRAS 7 TOTAL SCORE: 6    Pt. Is experiencing dizziness and worsening of anxiety while taking BUSPAR; 5 mg. Would like to seek another option     Wt Readings from Last 4 Encounters:   01/17/22 137.3 kg (302 lb 11.2 oz)   09/10/21 141.2 kg (311 lb 4.8 oz)   10/30/20 132.9 kg (293 lb)   07/29/20 128.4 kg (283 lb)     Social History     Tobacco Use     Smoking status: Never Smoker     Smokeless tobacco: Never Used   Vaping Use     Vaping Use: Never used   Substance Use Topics     Alcohol use: Yes     Comment: Soc-wine beer mixed drink     Drug use: Never     PHQ 1/17/2022   PHQ-9 Total Score 1   Q9: Thoughts of better off dead/self-harm past 2 weeks Not at all     ESDRAS-7 SCORE 11/22/2021 1/17/2022   Total Score 12 (moderate anxiety) 6 (mild anxiety)   Total Score 12 6     Last PHQ-9 1/17/2022   1.  Little interest or pleasure in doing things 0   2.  Feeling down, depressed, or hopeless 0   3.  Trouble falling or staying asleep, or sleeping too much 1   4.  Feeling tired or having little energy 0   5.  Poor appetite or overeating 0   6.  Feeling bad about yourself 0   7.  Trouble concentrating 0   8.  Moving slowly or restless 0   Q9: Thoughts of better off dead/self-harm past 2 weeks 0   PHQ-9 Total Score 1     ESDRAS-7  1/17/2022   1. Feeling nervous, anxious, or on edge 1   2. Not being able to stop or control worrying 1   3. Worrying too much about different things 1   4. Trouble relaxing 1   5. Being so restless that it is hard to sit still 1   6. Becoming easily annoyed or irritable 0   7. Feeling afraid, as if something awful might happen 1   ESDRAS-7 Total Score 6       Suicide  Assessment Five-step Evaluation and Treatment (SAFE-T)        How many servings of fruits and vegetables do you eat daily?  0-1    On average, how many sweetened beverages do you drink each day (Examples: soda, juice, sweet tea, etc.  Do NOT count diet or artificially sweetened beverages)?   0    How many days per week do you exercise enough to make your heart beat faster? 4    How many minutes a day do you exercise enough to make your heart beat faster? 30 - 60  How many days per week do you miss taking your medication? 1    What makes it hard for you to take your medications?  remembering to take        Review of Systems         Objective    /83   Pulse 105   Temp 97.3  F (36.3  C) (Temporal)   Wt 137.3 kg (302 lb 11.2 oz)   LMP 01/03/2022   SpO2 100%   BMI 49.61 kg/m    Body mass index is 49.61 kg/m .  Physical Exam

## 2022-01-18 ASSESSMENT — ANXIETY QUESTIONNAIRES: GAD7 TOTAL SCORE: 6

## 2022-01-18 ASSESSMENT — PATIENT HEALTH QUESTIONNAIRE - PHQ9: SUM OF ALL RESPONSES TO PHQ QUESTIONS 1-9: 1

## 2022-03-15 ENCOUNTER — TELEPHONE (OUTPATIENT)
Dept: FAMILY MEDICINE | Facility: CLINIC | Age: 32
End: 2022-03-15
Payer: COMMERCIAL

## 2022-03-15 DIAGNOSIS — F41.9 ANXIETY: ICD-10-CM

## 2022-03-15 RX ORDER — HYDROXYZINE HYDROCHLORIDE 25 MG/1
25 TABLET, FILM COATED ORAL 3 TIMES DAILY PRN
Qty: 60 TABLET | Refills: 0 | Status: SHIPPED | OUTPATIENT
Start: 2022-03-15 | End: 2022-04-27

## 2022-03-15 NOTE — TELEPHONE ENCOUNTER
Patient calling for refill   Prescription approved per [facility] Refill Protocol.  Zhanna ROBIN RN

## 2022-04-15 NOTE — PROGRESS NOTES
"  Assessment & Plan     Morbid obesity (H)  Assessment: Patient was started on phentermine and topamax for weight loss. She gained weight in the three months despite taking both medications. She was informed that we will place the medication on hold for the next three months and re-consider starting the medication in July. We briefly discussed Saxenda but the patient's insurance does not cover Saxenda. I advised her to continue with dietary restrictions and increasing her physical activity and contact us in three months.   Plan:  - CBC with platelets; Future  - Comprehensive metabolic panel (BMP + Alb, Alk Phos, ALT, AST, Total. Bili, TP); Future  - Hemoglobin A1c; Future  - TSH with free T4 reflex; Future    Eczema, unspecified type  Scattered rashes on both elbows and behind knee. Dermatitis herpetiformis vs. Eczema   - triamcinolone (KENALOG) 0.1 % external ointment; Apply topically 2 times daily      Return in about 3 months (around 7/18/2022) for consider starting phenteramine again in 07/2022. .    Berna Valladares MD  [facility]    Steve Rodriguez is a 31 year old who presents for the following health issues:   HPI     Weight Management Follow Up   Medication Followup of phentermine (ADIPEX-P) 37.5 MG capsule    Taking Medication as prescribed: yes    Side Effects:  None    Medication Helping Symptoms:  yes     Patient is working with a  to lose weight. Her weight increased from 302 to 307.   Wt Readings from Last 4 Encounters:   04/18/22 139.3 kg (307 lb)   01/17/22 137.3 kg (302 lb 11.2 oz)   09/10/21 141.2 kg (311 lb 4.8 oz)   10/30/20 132.9 kg (293 lb)       Review of Systems   Constitutional, HEENT, cardiovascular, pulmonary, gi and gu systems are negative, except as otherwise noted.      Objective    /85   Pulse 83   Temp 99.2  F (37.3  C) (Tympanic)   Resp 16   Ht 1.638 m (5' 4.5\")   Wt 139.3 kg (307 lb)   SpO2 (!) 83%   BMI 51.88 kg/m    Body " mass index is 51.88 kg/m .  Physical Exam   GENERAL: healthy, alert and no distress  RESP: lungs clear to auscultation - no rales, rhonchi or wheezes  CV: regular rate and rhythm, normal S1 S2, no S3 or S4, no murmur, click or rub, no peripheral edema and peripheral pulses strong    Results for orders placed or performed in visit on 04/18/22   CBC with platelets     Status: Abnormal   Result Value Ref Range    WBC Count 14.3 (H) 4.0 - 11.0 10e3/uL    RBC Count 4.69 3.80 - 5.20 10e6/uL    Hemoglobin 12.0 11.7 - 15.7 g/dL    Hematocrit 38.1 35.0 - 47.0 %    MCV 81 78 - 100 fL    MCH 25.6 (L) 26.5 - 33.0 pg    MCHC 31.5 31.5 - 36.5 g/dL    RDW 15.8 (H) 10.0 - 15.0 %    Platelet Count 489 (H) 150 - 450 10e3/uL   Hemoglobin A1c     Status: Normal   Result Value Ref Range    Hemoglobin A1C 5.5 0.0 - 5.6 %

## 2022-04-18 ENCOUNTER — OFFICE VISIT (OUTPATIENT)
Dept: FAMILY MEDICINE | Facility: CLINIC | Age: 32
End: 2022-04-18
Payer: COMMERCIAL

## 2022-04-18 VITALS
RESPIRATION RATE: 16 BRPM | DIASTOLIC BLOOD PRESSURE: 85 MMHG | OXYGEN SATURATION: 83 % | WEIGHT: 293 LBS | SYSTOLIC BLOOD PRESSURE: 133 MMHG | HEIGHT: 65 IN | TEMPERATURE: 99.2 F | HEART RATE: 83 BPM | BODY MASS INDEX: 48.82 KG/M2

## 2022-04-18 DIAGNOSIS — L30.9 ECZEMA, UNSPECIFIED TYPE: ICD-10-CM

## 2022-04-18 DIAGNOSIS — D72.829 LEUKOCYTOSIS, UNSPECIFIED TYPE: ICD-10-CM

## 2022-04-18 DIAGNOSIS — E66.01 MORBID OBESITY (H): Primary | ICD-10-CM

## 2022-04-18 LAB
ERYTHROCYTE [DISTWIDTH] IN BLOOD BY AUTOMATED COUNT: 15.8 % (ref 10–15)
HBA1C MFR BLD: 5.5 % (ref 0–5.6)
HCT VFR BLD AUTO: 38.1 % (ref 35–47)
HGB BLD-MCNC: 12 G/DL (ref 11.7–15.7)
MCH RBC QN AUTO: 25.6 PG (ref 26.5–33)
MCHC RBC AUTO-ENTMCNC: 31.5 G/DL (ref 31.5–36.5)
MCV RBC AUTO: 81 FL (ref 78–100)
PLATELET # BLD AUTO: 489 10E3/UL (ref 150–450)
RBC # BLD AUTO: 4.69 10E6/UL (ref 3.8–5.2)
WBC # BLD AUTO: 14.3 10E3/UL (ref 4–11)

## 2022-04-18 PROCEDURE — 83036 HEMOGLOBIN GLYCOSYLATED A1C: CPT | Performed by: FAMILY MEDICINE

## 2022-04-18 PROCEDURE — 99214 OFFICE O/P EST MOD 30 MIN: CPT | Performed by: FAMILY MEDICINE

## 2022-04-18 PROCEDURE — 80050 GENERAL HEALTH PANEL: CPT | Performed by: FAMILY MEDICINE

## 2022-04-18 PROCEDURE — 36415 COLL VENOUS BLD VENIPUNCTURE: CPT | Performed by: FAMILY MEDICINE

## 2022-04-18 RX ORDER — TRIAMCINOLONE ACETONIDE 1 MG/G
OINTMENT TOPICAL 2 TIMES DAILY
Qty: 80 G | Refills: 0 | Status: SHIPPED | OUTPATIENT
Start: 2022-04-18 | End: 2023-02-10

## 2022-04-18 ASSESSMENT — ANXIETY QUESTIONNAIRES
4. TROUBLE RELAXING: SEVERAL DAYS
1. FEELING NERVOUS, ANXIOUS, OR ON EDGE: SEVERAL DAYS
GAD7 TOTAL SCORE: 6
5. BEING SO RESTLESS THAT IT IS HARD TO SIT STILL: SEVERAL DAYS
GAD7 TOTAL SCORE: 6
3. WORRYING TOO MUCH ABOUT DIFFERENT THINGS: SEVERAL DAYS
7. FEELING AFRAID AS IF SOMETHING AWFUL MIGHT HAPPEN: NOT AT ALL
7. FEELING AFRAID AS IF SOMETHING AWFUL MIGHT HAPPEN: NOT AT ALL
6. BECOMING EASILY ANNOYED OR IRRITABLE: SEVERAL DAYS
2. NOT BEING ABLE TO STOP OR CONTROL WORRYING: SEVERAL DAYS
GAD7 TOTAL SCORE: 6

## 2022-04-18 ASSESSMENT — PATIENT HEALTH QUESTIONNAIRE - PHQ9
SUM OF ALL RESPONSES TO PHQ QUESTIONS 1-9: 2
SUM OF ALL RESPONSES TO PHQ QUESTIONS 1-9: 2
10. IF YOU CHECKED OFF ANY PROBLEMS, HOW DIFFICULT HAVE THESE PROBLEMS MADE IT FOR YOU TO DO YOUR WORK, TAKE CARE OF THINGS AT HOME, OR GET ALONG WITH OTHER PEOPLE: SOMEWHAT DIFFICULT

## 2022-04-18 NOTE — NURSING NOTE
"Chief Complaint   Patient presents with     Recheck Medication     initial /85   Pulse 83   Temp 99.2  F (37.3  C) (Tympanic)   Resp 16   Ht 1.638 m (5' 4.5\")   Wt 139.3 kg (307 lb)   SpO2 (!) 83%   BMI 51.88 kg/m   Estimated body mass index is 51.88 kg/m  as calculated from the following:    Height as of this encounter: 1.638 m (5' 4.5\").    Weight as of this encounter: 139.3 kg (307 lb).  BP completed using cuff size: large.  R arm      Health Maintenance that is potentially due pending provider review:  NONE    n/a    Raphael López ma  "

## 2022-04-19 LAB
ALBUMIN SERPL-MCNC: 3.2 G/DL (ref 3.4–5)
ALP SERPL-CCNC: 61 U/L (ref 40–150)
ALT SERPL W P-5'-P-CCNC: 15 U/L (ref 0–50)
ANION GAP SERPL CALCULATED.3IONS-SCNC: 6 MMOL/L (ref 3–14)
AST SERPL W P-5'-P-CCNC: 11 U/L (ref 0–45)
BASOPHILS # BLD AUTO: 0.1 10E3/UL (ref 0–0.2)
BASOPHILS NFR BLD AUTO: 0 %
BILIRUB SERPL-MCNC: 0.2 MG/DL (ref 0.2–1.3)
BUN SERPL-MCNC: 11 MG/DL (ref 7–30)
CALCIUM SERPL-MCNC: 9.1 MG/DL (ref 8.5–10.1)
CHLORIDE BLD-SCNC: 108 MMOL/L (ref 94–109)
CO2 SERPL-SCNC: 23 MMOL/L (ref 20–32)
CREAT SERPL-MCNC: 0.79 MG/DL (ref 0.52–1.04)
EOSINOPHIL # BLD AUTO: 0.3 10E3/UL (ref 0–0.7)
EOSINOPHIL NFR BLD AUTO: 2 %
GFR SERPL CREATININE-BSD FRML MDRD: >90 ML/MIN/1.73M2
GLUCOSE BLD-MCNC: 85 MG/DL (ref 70–99)
LYMPHOCYTES # BLD AUTO: 4.4 10E3/UL (ref 0.8–5.3)
LYMPHOCYTES NFR BLD AUTO: 30 %
MONOCYTES # BLD AUTO: 1 10E3/UL (ref 0–1.3)
MONOCYTES NFR BLD AUTO: 7 %
NEUTROPHILS # BLD AUTO: 9.1 10E3/UL (ref 1.6–8.3)
NEUTROPHILS NFR BLD AUTO: 61 %
POTASSIUM BLD-SCNC: 4 MMOL/L (ref 3.4–5.3)
PROT SERPL-MCNC: 7.5 G/DL (ref 6.8–8.8)
SODIUM SERPL-SCNC: 137 MMOL/L (ref 133–144)
TSH SERPL DL<=0.005 MIU/L-ACNC: 1.31 MU/L (ref 0.4–4)
WBC # BLD AUTO: 14.9 10E3/UL (ref 4–11)

## 2022-04-19 ASSESSMENT — ANXIETY QUESTIONNAIRES: GAD7 TOTAL SCORE: 6

## 2022-04-27 DIAGNOSIS — F41.9 ANXIETY: ICD-10-CM

## 2022-04-27 RX ORDER — HYDROXYZINE HYDROCHLORIDE 25 MG/1
25 TABLET, FILM COATED ORAL 3 TIMES DAILY PRN
Qty: 270 TABLET | Refills: 0 | Status: SHIPPED | OUTPATIENT
Start: 2022-04-27 | End: 2023-02-10

## 2023-01-07 ENCOUNTER — HEALTH MAINTENANCE LETTER (OUTPATIENT)
Age: 33
End: 2023-01-07

## 2023-02-07 ENCOUNTER — ANCILLARY PROCEDURE (OUTPATIENT)
Dept: GENERAL RADIOLOGY | Facility: CLINIC | Age: 33
End: 2023-02-07
Attending: FAMILY MEDICINE
Payer: COMMERCIAL

## 2023-02-07 ENCOUNTER — OFFICE VISIT (OUTPATIENT)
Dept: URGENT CARE | Facility: URGENT CARE | Age: 33
End: 2023-02-07
Payer: COMMERCIAL

## 2023-02-07 VITALS
BODY MASS INDEX: 51.88 KG/M2 | TEMPERATURE: 97.7 F | SYSTOLIC BLOOD PRESSURE: 142 MMHG | HEIGHT: 65 IN | OXYGEN SATURATION: 98 % | HEART RATE: 86 BPM | DIASTOLIC BLOOD PRESSURE: 91 MMHG

## 2023-02-07 DIAGNOSIS — M25.572 ACUTE LEFT ANKLE PAIN: Primary | ICD-10-CM

## 2023-02-07 DIAGNOSIS — S93.492A SPRAIN OF OTHER LIGAMENT OF LEFT ANKLE, INITIAL ENCOUNTER: ICD-10-CM

## 2023-02-07 PROCEDURE — 99214 OFFICE O/P EST MOD 30 MIN: CPT | Performed by: FAMILY MEDICINE

## 2023-02-07 PROCEDURE — 73610 X-RAY EXAM OF ANKLE: CPT | Mod: TC | Performed by: RADIOLOGY

## 2023-02-07 NOTE — PROGRESS NOTES
SUBJECTIVE:  Chief Complaint   Patient presents with     Urgent Care     Today pt fell on ice and fell on lt ankle, rolled underneath body, no pressure can be added    Jennifer was seen today for urgent care.    Diagnoses and all orders for this visit:    Acute left ankle pain  -     XR Ankle Left G/E 3 Views    Sprain of other ligament of left ankle, initial encounter  -     Ankle/Foot Bracing Supplies DME Walking Boot; Left; Pneumatic; Short        X-RAY was done.  Xray  did show avulsion on the medial aspect of the ankle where she does not have any pain  Discussed with patient with the finding  Cam walker was dispensed and advised patient to wear that when ambulatory can take it off when she is sleeping  If pain does not get better over the next 1 week time should follow-up with Ortho.    D/d   contusion, sprain/strain, fracture and dislocation of ankle    PLAN:  1) Rest, Ice, Compress, Elevate and Ibuprofen q 6 hrs for 3-5 days      Jennifer Reyes is a 32 year old female presents with a chief complaint of left ankle pain, tenderness and decreased range of motion.  The injury occurred 1 hour(s) ago.   The injury happened while while walking. How: fall immediate pain, inability to bear weight directly after injury.  The patient complained of moderate pain  and has had decreased ROM.  Pain exacerbated by weight-bearing.  Relieved by rest.  She treated it initially with no therapy. This is the first time this type of injury has occurred to this patient.     No past medical history on file.  Current Outpatient Medications   Medication Sig Dispense Refill     hydrOXYzine (ATARAX) 25 MG tablet Take 1 tablet (25 mg) by mouth 3 times daily as needed for anxiety 270 tablet 0     Melatonin 10 MG TABS tablet Take 10 mg by mouth nightly as needed for sleep Anywhere from 5-10MG       Alcohol Swabs (ALCOHOL PREP) PADS 1 each daily 100 each 0     insulin pen needle (31G X 5 MM) 31G X 5 MM miscellaneous Use 1 pen needles daily  "to be used with Saxenda pen. 100 each 0     NIFEdipine ER (ADALAT CC) 60 MG 24 hr tablet Take 1 tablet (60 mg) by mouth daily 90 tablet 0     phentermine (ADIPEX-P) 37.5 MG capsule Take 1 capsule (37.5 mg) by mouth every morning (Patient not taking: Reported on 2/7/2023) 30 capsule 3     topiramate (TOPAMAX) 25 MG tablet Take 3 tablets (75 mg) by mouth At Bedtime 90 tablet 2     triamcinolone (KENALOG) 0.1 % external ointment Apply topically 2 times daily (Patient not taking: Reported on 2/7/2023) 80 g 0     Social History     Tobacco Use     Smoking status: Never     Smokeless tobacco: Never   Substance Use Topics     Alcohol use: Yes     Comment: Soc-wine beer mixed drink       ROS:  Review of systems negative except as stated above.    EXAM:   BP (!) 142/91 (BP Location: Left arm, Patient Position: Sitting, Cuff Size: Adult Large)   Pulse 86   Temp 97.7  F (36.5  C) (Oral)   Ht 1.638 m (5' 4.5\")   SpO2 98%   BMI 51.88 kg/m    Gen: healthy,alert,no distress  Extremity: ankle has swelling, point tenderness lateral malleolus   and decreased ROM   There is not compromise to the distal circulation.  Pulses are +2 and CRT is brisk  GENERAL APPEARANCE: healthy, alert and no distress  EXTREMITIES: peripheral pulses normal  SKIN: no suspicious lesions or rashes    Ellen Trujillo MD       "

## 2023-02-07 NOTE — PATIENT INSTRUCTIONS
BIG: Rest: At first, keep weight off the ankle as much as you can. You may be given crutches to help you walk without putting weight on the ankle.  BIG: Ice: Put an ice pack on the ankle for 20 minutes. Remove the pack and wait at least 30 minutes. Repeat for up to 3 days. This helps reduce swelling.  BIG: Compression: To reduce swelling and keep the joint stable, you may need to wrap the ankle with an elastic bandage. For more severe sprains, you may need an ankle brace, a boot, or a cast.  BIG: Elevation: To reduce swelling, keep your ankle raised above your heart when you sit or lie down.  Ellen Trujillo MD

## 2023-02-10 ENCOUNTER — OFFICE VISIT (OUTPATIENT)
Dept: FAMILY MEDICINE | Facility: CLINIC | Age: 33
End: 2023-02-10
Payer: COMMERCIAL

## 2023-02-10 VITALS
RESPIRATION RATE: 16 BRPM | HEART RATE: 94 BPM | HEIGHT: 65 IN | WEIGHT: 293 LBS | TEMPERATURE: 97.3 F | SYSTOLIC BLOOD PRESSURE: 122 MMHG | BODY MASS INDEX: 48.82 KG/M2 | OXYGEN SATURATION: 96 % | DIASTOLIC BLOOD PRESSURE: 84 MMHG

## 2023-02-10 DIAGNOSIS — Z23 HIGH PRIORITY FOR 2019-NCOV VACCINE: ICD-10-CM

## 2023-02-10 DIAGNOSIS — E66.01 MORBID OBESITY (H): ICD-10-CM

## 2023-02-10 DIAGNOSIS — F41.9 ANXIETY: ICD-10-CM

## 2023-02-10 DIAGNOSIS — R03.0 ELEVATED BLOOD PRESSURE READING WITHOUT DIAGNOSIS OF HYPERTENSION: ICD-10-CM

## 2023-02-10 DIAGNOSIS — Z00.00 ENCOUNTER FOR PREVENTATIVE ADULT HEALTH CARE EXAMINATION: Primary | ICD-10-CM

## 2023-02-10 DIAGNOSIS — L30.9 ECZEMA, UNSPECIFIED TYPE: ICD-10-CM

## 2023-02-10 LAB
ALBUMIN SERPL BCG-MCNC: 3.8 G/DL (ref 3.5–5.2)
ALP SERPL-CCNC: 60 U/L (ref 35–104)
ALT SERPL W P-5'-P-CCNC: 12 U/L (ref 10–35)
ANION GAP SERPL CALCULATED.3IONS-SCNC: 12 MMOL/L (ref 7–15)
AST SERPL W P-5'-P-CCNC: 19 U/L (ref 10–35)
BASOPHILS # BLD AUTO: 0 10E3/UL (ref 0–0.2)
BASOPHILS NFR BLD AUTO: 0 %
BILIRUB SERPL-MCNC: 0.2 MG/DL
BUN SERPL-MCNC: 9.4 MG/DL (ref 6–20)
CALCIUM SERPL-MCNC: 9.3 MG/DL (ref 8.6–10)
CHLORIDE SERPL-SCNC: 107 MMOL/L (ref 98–107)
CHOLEST SERPL-MCNC: 169 MG/DL
CREAT SERPL-MCNC: 0.73 MG/DL (ref 0.51–0.95)
CREAT UR-MCNC: 183 MG/DL
DEPRECATED HCO3 PLAS-SCNC: 21 MMOL/L (ref 22–29)
EOSINOPHIL # BLD AUTO: 0.3 10E3/UL (ref 0–0.7)
EOSINOPHIL NFR BLD AUTO: 3 %
ERYTHROCYTE [DISTWIDTH] IN BLOOD BY AUTOMATED COUNT: 16.3 % (ref 10–15)
GFR SERPL CREATININE-BSD FRML MDRD: >90 ML/MIN/1.73M2
GLUCOSE SERPL-MCNC: 102 MG/DL (ref 70–99)
HBA1C MFR BLD: 5.8 % (ref 0–5.6)
HCT VFR BLD AUTO: 38.9 % (ref 35–47)
HDLC SERPL-MCNC: 37 MG/DL
HGB BLD-MCNC: 12.3 G/DL (ref 11.7–15.7)
LDLC SERPL CALC-MCNC: 103 MG/DL
LYMPHOCYTES # BLD AUTO: 3.3 10E3/UL (ref 0.8–5.3)
LYMPHOCYTES NFR BLD AUTO: 31 %
MCH RBC QN AUTO: 25.5 PG (ref 26.5–33)
MCHC RBC AUTO-ENTMCNC: 31.6 G/DL (ref 31.5–36.5)
MCV RBC AUTO: 81 FL (ref 78–100)
MICROALBUMIN UR-MCNC: <12 MG/L
MICROALBUMIN/CREAT UR: NORMAL MG/G{CREAT}
MONOCYTES # BLD AUTO: 0.6 10E3/UL (ref 0–1.3)
MONOCYTES NFR BLD AUTO: 6 %
NEUTROPHILS # BLD AUTO: 6.6 10E3/UL (ref 1.6–8.3)
NEUTROPHILS NFR BLD AUTO: 61 %
NONHDLC SERPL-MCNC: 132 MG/DL
PLATELET # BLD AUTO: 456 10E3/UL (ref 150–450)
POTASSIUM SERPL-SCNC: 4.1 MMOL/L (ref 3.4–5.3)
PROT SERPL-MCNC: 7.2 G/DL (ref 6.4–8.3)
RBC # BLD AUTO: 4.82 10E6/UL (ref 3.8–5.2)
SODIUM SERPL-SCNC: 140 MMOL/L (ref 136–145)
TRIGL SERPL-MCNC: 144 MG/DL
WBC # BLD AUTO: 10.8 10E3/UL (ref 4–11)

## 2023-02-10 PROCEDURE — 80053 COMPREHEN METABOLIC PANEL: CPT | Performed by: FAMILY MEDICINE

## 2023-02-10 PROCEDURE — 91313 COVID-19 VACCINE BIVALENT BOOSTER 18+ (MODERNA): CPT | Performed by: FAMILY MEDICINE

## 2023-02-10 PROCEDURE — 0134A COVID-19 VACCINE BIVALENT BOOSTER 18+ (MODERNA): CPT | Performed by: FAMILY MEDICINE

## 2023-02-10 PROCEDURE — 99395 PREV VISIT EST AGE 18-39: CPT | Performed by: FAMILY MEDICINE

## 2023-02-10 PROCEDURE — 85025 COMPLETE CBC W/AUTO DIFF WBC: CPT | Performed by: FAMILY MEDICINE

## 2023-02-10 PROCEDURE — 82570 ASSAY OF URINE CREATININE: CPT | Performed by: FAMILY MEDICINE

## 2023-02-10 PROCEDURE — 82043 UR ALBUMIN QUANTITATIVE: CPT | Performed by: FAMILY MEDICINE

## 2023-02-10 PROCEDURE — 36415 COLL VENOUS BLD VENIPUNCTURE: CPT | Performed by: FAMILY MEDICINE

## 2023-02-10 PROCEDURE — 99000 SPECIMEN HANDLING OFFICE-LAB: CPT | Performed by: FAMILY MEDICINE

## 2023-02-10 PROCEDURE — 84244 ASSAY OF RENIN: CPT | Mod: 90 | Performed by: FAMILY MEDICINE

## 2023-02-10 PROCEDURE — 80061 LIPID PANEL: CPT | Performed by: FAMILY MEDICINE

## 2023-02-10 PROCEDURE — 82088 ASSAY OF ALDOSTERONE: CPT | Performed by: FAMILY MEDICINE

## 2023-02-10 PROCEDURE — 83036 HEMOGLOBIN GLYCOSYLATED A1C: CPT | Performed by: FAMILY MEDICINE

## 2023-02-10 RX ORDER — HYDROXYZINE HYDROCHLORIDE 25 MG/1
25 TABLET, FILM COATED ORAL 3 TIMES DAILY PRN
Qty: 270 TABLET | Refills: 1 | Status: SHIPPED | OUTPATIENT
Start: 2023-02-10 | End: 2024-06-27

## 2023-02-10 RX ORDER — TRIAMCINOLONE ACETONIDE 1 MG/G
OINTMENT TOPICAL 2 TIMES DAILY
Qty: 80 G | Refills: 0 | Status: SHIPPED | OUTPATIENT
Start: 2023-02-10 | End: 2024-06-24

## 2023-02-10 ASSESSMENT — ENCOUNTER SYMPTOMS
CHILLS: 0
MYALGIAS: 0
HEARTBURN: 0
CONSTIPATION: 0
WEAKNESS: 0
HEADACHES: 0
BREAST MASS: 0
ARTHRALGIAS: 0
SHORTNESS OF BREATH: 0
FEVER: 0
HEMATOCHEZIA: 0
PARESTHESIAS: 0
JOINT SWELLING: 0
COUGH: 0
EYE PAIN: 0
DIARRHEA: 0
PALPITATIONS: 0
FREQUENCY: 0
SORE THROAT: 0
NERVOUS/ANXIOUS: 0
NAUSEA: 0
HEMATURIA: 0
DIZZINESS: 0
DYSURIA: 0
ABDOMINAL PAIN: 0

## 2023-02-10 ASSESSMENT — ANXIETY QUESTIONNAIRES
4. TROUBLE RELAXING: SEVERAL DAYS
5. BEING SO RESTLESS THAT IT IS HARD TO SIT STILL: SEVERAL DAYS
1. FEELING NERVOUS, ANXIOUS, OR ON EDGE: SEVERAL DAYS
IF YOU CHECKED OFF ANY PROBLEMS ON THIS QUESTIONNAIRE, HOW DIFFICULT HAVE THESE PROBLEMS MADE IT FOR YOU TO DO YOUR WORK, TAKE CARE OF THINGS AT HOME, OR GET ALONG WITH OTHER PEOPLE: SOMEWHAT DIFFICULT
7. FEELING AFRAID AS IF SOMETHING AWFUL MIGHT HAPPEN: NOT AT ALL
GAD7 TOTAL SCORE: 4
2. NOT BEING ABLE TO STOP OR CONTROL WORRYING: NOT AT ALL
6. BECOMING EASILY ANNOYED OR IRRITABLE: NOT AT ALL
GAD7 TOTAL SCORE: 4
7. FEELING AFRAID AS IF SOMETHING AWFUL MIGHT HAPPEN: NOT AT ALL
8. IF YOU CHECKED OFF ANY PROBLEMS, HOW DIFFICULT HAVE THESE MADE IT FOR YOU TO DO YOUR WORK, TAKE CARE OF THINGS AT HOME, OR GET ALONG WITH OTHER PEOPLE?: SOMEWHAT DIFFICULT
GAD7 TOTAL SCORE: 4
3. WORRYING TOO MUCH ABOUT DIFFERENT THINGS: SEVERAL DAYS

## 2023-02-10 ASSESSMENT — PAIN SCALES - GENERAL: PAINLEVEL: MODERATE PAIN (5)

## 2023-02-10 ASSESSMENT — PATIENT HEALTH QUESTIONNAIRE - PHQ9
SUM OF ALL RESPONSES TO PHQ QUESTIONS 1-9: 4
10. IF YOU CHECKED OFF ANY PROBLEMS, HOW DIFFICULT HAVE THESE PROBLEMS MADE IT FOR YOU TO DO YOUR WORK, TAKE CARE OF THINGS AT HOME, OR GET ALONG WITH OTHER PEOPLE: NOT DIFFICULT AT ALL
SUM OF ALL RESPONSES TO PHQ QUESTIONS 1-9: 4

## 2023-02-10 NOTE — LETTER
February 24, 2023      Jennifer A Reyes  [address] [address]        Dear ,    We are writing to inform you of your test results.We attempted to send you a studdex message but you have not viewed it yet. Here is a copy of the studdex message and your lab results.   - Your results revealed a mild elevation in your A1C. A1C is a measurement of your average blood glucose over the past 3 months. Your results are consistent with pre-diabetes. I would recommend dietary restriction of high calorie meals and avoiding simple carbohydrates to prevent progression into diabetes.      We will re-check your A1C in 3 months.    - There is a mild elevation in your cholesterol.  Continue to monitor your dietary intake of fat and carbohydrates.  We will recheck in 1 year.    Results for orders placed or performed in visit on 02/10/23   Comprehensive metabolic panel (BMP + Alb, Alk Phos, ALT, AST, Total. Bili, TP)     Status: Abnormal   Result Value Ref Range    Sodium 140 136 - 145 mmol/L    Potassium 4.1 3.4 - 5.3 mmol/L    Chloride 107 98 - 107 mmol/L    Carbon Dioxide (CO2) 21 (L) 22 - 29 mmol/L    Anion Gap 12 7 - 15 mmol/L    Urea Nitrogen 9.4 6.0 - 20.0 mg/dL    Creatinine 0.73 0.51 - 0.95 mg/dL    Calcium 9.3 8.6 - 10.0 mg/dL    Glucose 102 (H) 70 - 99 mg/dL    Alkaline Phosphatase 60 35 - 104 U/L    AST 19 10 - 35 U/L    ALT 12 10 - 35 U/L    Protein Total 7.2 6.4 - 8.3 g/dL    Albumin 3.8 3.5 - 5.2 g/dL    Bilirubin Total 0.2 <=1.2 mg/dL    GFR Estimate >90 >60 mL/min/1.73m2   Lipid panel reflex to direct LDL Fasting     Status: Abnormal   Result Value Ref Range    Cholesterol 169 <200 mg/dL    Triglycerides 144 <150 mg/dL    Direct Measure HDL 37 (L) >=50 mg/dL    LDL Cholesterol Calculated 103 (H) <=100 mg/dL    Non HDL Cholesterol 132 (H) <130 mg/dL    Narrative    Cholesterol  Desirable:  <200 mg/dL    Triglycerides  Normal:  Less than 150 mg/dL  Borderline High:  150-199 mg/dL  High:   200-499 mg/dL  Very High:  Greater than or equal to 500 mg/dL    Direct Measure HDL  Female:  Greater than or equal to 50 mg/dL   Male:  Greater than or equal to 40 mg/dL    LDL Cholesterol  Desirable:  <100mg/dL  Above Desirable:  100-129 mg/dL   Borderline High:  130-159 mg/dL   High:  160-189 mg/dL   Very High:  >= 190 mg/dL    Non HDL Cholesterol  Desirable:  130 mg/dL  Above Desirable:  130-159 mg/dL  Borderline High:  160-189 mg/dL  High:  190-219 mg/dL  Very High:  Greater than or equal to 220 mg/dL   Hemoglobin A1c     Status: Abnormal   Result Value Ref Range    Hemoglobin A1C 5.8 (H) 0.0 - 5.6 %   Albumin Random Urine Quantitative with Creat Ratio     Status: None   Result Value Ref Range    Creatinine Urine mg/dL 183.0 mg/dL    Albumin Urine mg/L <12.0 mg/L    Albumin Urine mg/g Cr     Aldosterone     Status: Normal   Result Value Ref Range    Aldosterone 10.3 0.0 - 31.0 ng/dL   Renin activity     Status: None   Result Value Ref Range    Renin Activity 0.7 ng/mL/hr   Aldosterone Renin Ratio     Status: Normal   Result Value Ref Range    Aldosterone Renin Ratio 14.7 0.0 - 25.0   CBC with platelets and differential     Status: Abnormal   Result Value Ref Range    WBC Count 10.8 4.0 - 11.0 10e3/uL    RBC Count 4.82 3.80 - 5.20 10e6/uL    Hemoglobin 12.3 11.7 - 15.7 g/dL    Hematocrit 38.9 35.0 - 47.0 %    MCV 81 78 - 100 fL    MCH 25.5 (L) 26.5 - 33.0 pg    MCHC 31.6 31.5 - 36.5 g/dL    RDW 16.3 (H) 10.0 - 15.0 %    Platelet Count 456 (H) 150 - 450 10e3/uL    % Neutrophils 61 %    % Lymphocytes 31 %    % Monocytes 6 %    % Eosinophils 3 %    % Basophils 0 %    Absolute Neutrophils 6.6 1.6 - 8.3 10e3/uL    Absolute Lymphocytes 3.3 0.8 - 5.3 10e3/uL    Absolute Monocytes 0.6 0.0 - 1.3 10e3/uL    Absolute Eosinophils 0.3 0.0 - 0.7 10e3/uL    Absolute Basophils 0.0 0.0 - 0.2 10e3/uL   Aldosterone Renin Ratio     Status: None    Narrative    The following orders were created for panel order Aldosterone Renin  Ratio.  Procedure                               Abnormality         Status                     ---------                               -----------         ------                     Aldosterone[[id number]]                  Normal              Final result               Renin activity[[id number]]                                   Final result               Aldosterone Renin Ratio[[id number]]      Normal              Final result                 Please view results for these tests on the individual orders.   CBC with platelets and differential     Status: Abnormal    Narrative    The following orders were created for panel order CBC with platelets and differential.  Procedure                               Abnormality         Status                     ---------                               -----------         ------                     CBC with platelets and d...[[id number]]  Abnormal            Final result                 Please view results for these tests on the individual orders.   If you have any questions or concerns, please call the clinic at the number listed above.       Sincerely,

## 2023-02-10 NOTE — PROGRESS NOTES
SUBJECTIVE:   CC: Jennifer is an 32 year old who presents for preventive health visit.     Healthy Habits:     Getting at least 3 servings of Calcium per day:  Yes    Bi-annual eye exam:  Yes    Dental care twice a year:  Yes    Sleep apnea or symptoms of sleep apnea:  Excessive snoring    Diet:  Gluten-free/reduced    Frequency of exercise:  2-3 days/week    Duration of exercise:  30-45 minutes    Taking medications regularly:  Yes    Medication side effects:  None    PHQ-2 Total Score: 2    Additional concerns today:  No    Today's PHQ-2 Score:   PHQ-2 ( 1999 [employer]) 2/10/2023   Q1: Little interest or pleasure in doing things 0   Q2: Feeling down, depressed or hopeless 0   PHQ-2 Score 0   PHQ-2 Total Score (12-17 Years)- Positive if 3 or more points; Administer PHQ-A if positive -   Q1: Little interest or pleasure in doing things Not at all   Q2: Feeling down, depressed or hopeless Not at all   PHQ-2 Score 0     PHQ 1/17/2022 4/18/2022 2/10/2023   PHQ-9 Total Score 1 2 4   Q9: Thoughts of better off dead/self-harm past 2 weeks Not at all Not at all Not at all     Social History     Tobacco Use     Smoking status: Never     Smokeless tobacco: Never   Substance Use Topics     Alcohol use: Yes     Comment: Soc-wine beer mixed drink     If you drink alcohol do you typically have >3 drinks per day or >7 drinks per week? No    Alcohol Use 2/10/2023   Prescreen: >3 drinks/day or >7 drinks/week? No   Prescreen: >3 drinks/day or >7 drinks/week? -   No flowsheet data found.    Reviewed orders with patient.  Reviewed health maintenance and updated orders accordingly - Yes  Labs reviewed in EPIC    Breast Cancer Screening:    Breast CA Risk Assessment (FHS-7) 9/10/2021   Do you have a family history of breast, colon, or ovarian cancer? No / Unknown     Patient under 40 years of age: Routine Mammogram Screening not recommended.   Pertinent mammograms are reviewed under the imaging tab.    History of abnormal Pap smear: NO -  "age 30- 65 PAP every 3 years recommended  PAP / HPV Latest Ref Rng & Units 9/10/2021   PAP   Negative for Intraepithelial Lesion or Malignancy (NILM)     Reviewed and updated as needed this visit by clinical staff   Tobacco  Allergies  Meds  Problems  Med Hx  Surg Hx  Fam Hx          Reviewed and updated as needed this visit by Provider   Tobacco  Allergies  Meds  Problems  Med Hx  Surg Hx  Fam Hx         Review of Systems   Constitutional: Negative for chills and fever.   HENT: Negative for congestion, ear pain, hearing loss and sore throat.    Eyes: Negative for pain and visual disturbance.   Respiratory: Negative for cough and shortness of breath.    Cardiovascular: Negative for chest pain, palpitations and peripheral edema.   Gastrointestinal: Negative for abdominal pain, constipation, diarrhea, heartburn, hematochezia and nausea.   Breasts:  Negative for tenderness, breast mass and discharge.   Genitourinary: Negative for dysuria, frequency, genital sores, hematuria, pelvic pain, urgency, vaginal bleeding and vaginal discharge.   Musculoskeletal: Negative for arthralgias, joint swelling and myalgias.   Skin: Negative for rash.   Neurological: Negative for dizziness, weakness, headaches and paresthesias.   Psychiatric/Behavioral: Negative for mood changes. The patient is not nervous/anxious.       OBJECTIVE:   BP (!) 142/94   Pulse 94   Temp 97.3  F (36.3  C) (Temporal)   Resp 16   Ht 1.651 m (5' 5\")   Wt 149.2 kg (329 lb)   LMP 02/06/2023   SpO2 96%   BMI 54.75 kg/m    Physical Exam  GENERAL: healthy, alert and no distress  EYES: Eyes grossly normal to inspection, PERRL and conjunctivae and sclerae normal  HENT: ear canals and TM's normal, nose and mouth without ulcers or lesions  NECK: no adenopathy, no asymmetry, masses, or scars and thyroid normal to palpation  RESP: lungs clear to auscultation - no rales, rhonchi or wheezes  BREAST: normal without masses, tenderness or nipple discharge " and no palpable axillary masses or adenopathy  CV: regular rate and rhythm, normal S1 S2, no S3 or S4, no murmur, click or rub, no peripheral edema and peripheral pulses strong  ABDOMEN: soft, nontender, no hepatosplenomegaly, no masses and bowel sounds normal  MS: no gross musculoskeletal defects noted, no edema  SKIN: no suspicious lesions or rashes  NEURO: Normal strength and tone, mentation intact and speech normal  PSYCH: mentation appears normal, affect normal/bright    Diagnostic Test Results:  Labs reviewed in Epic    ASSESSMENT/PLAN:   Jennifer was seen today for physical and imm/inj.    Diagnoses and all orders for this visit:    Encounter for preventative adult health care examination  -     CBC with platelets and differential; Future  -     Comprehensive metabolic panel (BMP + Alb, Alk Phos, ALT, AST, Total. Bili, TP); Future  -     Lipid panel reflex to direct LDL Fasting; Future  -     Hemoglobin A1c; Future  -     Albumin Random Urine Quantitative with Creat Ratio; Future    Eczema, unspecified type  -     triamcinolone (KENALOG) 0.1 % external ointment; Apply topically 2 times daily    Morbid obesity (H)  Assessment: Patient has a strong desire to lose weight.  She tried phentermine in the past without significant improvement in her weight.  Referred her to the weight management center.  Plan:  -      Comprehensive Weight Management; Future  Elevated blood pressure reading without diagnosis of hypertension    -     NIFEdipine ER (ADALAT CC) 60 MG 24 hr tablet; Take 1 tablet (60 mg) by mouth daily  -     Aldosterone; Future  -     Renin activity; Future  -     Aldosterone Renin Ratio; Future  -     Albumin Random Urine Quantitative with Creat Ratio; Future    Anxiety  -     hydrOXYzine (ATARAX) 25 MG tablet; Take 1 tablet (25 mg) by mouth 3 times daily as needed for anxiety    High priority for 2019-nCoV vaccine  -     COVID-19,PF,MODERNA BIVALENT 18+Yrs    Other orders  -     Cancel: INFLUENZA  "VACCINE IM > 6 MONTHS VALENT IIV4 (AFLURIA/FLUZONE)  -     REVIEW OF HEALTH MAINTENANCE PROTOCOL ORDERS    Health Maintenance Reviewed -   - Does not desire to get the flu vaccine today.     Patient has been advised of split billing requirements and indicates understanding: Yes      COUNSELING:  Reviewed preventive health counseling, as reflected in patient instructions       Regular exercise       Healthy diet/nutrition      BMI:   Estimated body mass index is 54.75 kg/m  as calculated from the following:    Height as of this encounter: 1.651 m (5' 5\").    Weight as of this encounter: 149.2 kg (329 lb).     She reports that she has never smoked. She has never used smokeless tobacco.  Berna Valladares MD  [facility]  "

## 2023-02-10 NOTE — NURSING NOTE
Prior to immunization administration, verified patients identity using patient s name and date of birth. Please see Immunization Activity for additional information.     Screening Questionnaire for Adult Immunization    Are you sick today?   No   Do you have allergies to medications, food, a vaccine component or latex?   No   Have you ever had a serious reaction after receiving a vaccination?   No   Do you have a long-term health problem with heart, lung, kidney, or metabolic disease (e.g., diabetes), asthma, a blood disorder, no spleen, complement component deficiency, a cochlear implant, or a spinal fluid leak?  Are you on long-term aspirin therapy?   No   Do you have cancer, leukemia, HIV/AIDS, or any other immune system problem?   No   Do you have a parent, brother, or sister with an immune system problem?   No   In the past 3 months, have you taken medications that affect  your immune system, such as prednisone, other steroids, or anticancer drugs; drugs for the treatment of rheumatoid arthritis, Crohn s disease, or psoriasis; or have you had radiation treatments?   No   Have you had a seizure, or a brain or other nervous system problem?   No   During the past year, have you received a transfusion of blood or blood    products, or been given immune (gamma) globulin or antiviral drug?   No   For women: Are you pregnant or is there a chance you could become       pregnant during the next month?   No   Have you received any vaccinations in the past 4 weeks?   No     Immunization questionnaire answers were all negative.        Per orders of Dr. Valladares, injection of moderna booster given by Sarina Archuleta. Patient instructed to remain in clinic for 15 minutes afterwards, and to report any adverse reaction to me immediately.       Screening performed by Sarina Archuleta on 2/10/2023 at 10:13 AM.

## 2023-02-14 LAB — ALDOST SERPL-MCNC: 10.3 NG/DL (ref 0–31)

## 2023-02-15 LAB
ALDOST/RENIN PLAS-RTO: 14.7 {RATIO} (ref 0–25)
RENIN PLAS-CCNC: 0.7 NG/ML/HR

## 2023-02-15 NOTE — RESULT ENCOUNTER NOTE
Dear Jennifer,   - Your results revealed a mild elevation in your A1C. A1C is a measurement of your average blood glucose over the past 3 months. Your results are consistent with pre-diabetes. I would recommend dietary restriction of high calorie meals and avoiding simple carbohydrates to prevent progression into diabetes.     We will re-check your A1C in 3 months.    - There is a mild elevation in your cholesterol.  Continue to monitor your dietary intake of fat and carbohydrates.  We will recheck in 1 year.    Best Regards  Berna Valladares MD

## 2024-01-11 ENCOUNTER — PATIENT OUTREACH (OUTPATIENT)
Dept: CARE COORDINATION | Facility: CLINIC | Age: 34
End: 2024-01-11
Payer: COMMERCIAL

## 2024-01-25 ENCOUNTER — PATIENT OUTREACH (OUTPATIENT)
Dept: CARE COORDINATION | Facility: CLINIC | Age: 34
End: 2024-01-25
Payer: COMMERCIAL

## 2024-04-20 ENCOUNTER — HEALTH MAINTENANCE LETTER (OUTPATIENT)
Age: 34
End: 2024-04-20

## 2024-06-24 ENCOUNTER — OFFICE VISIT (OUTPATIENT)
Dept: FAMILY MEDICINE | Facility: CLINIC | Age: 34
End: 2024-06-24
Payer: COMMERCIAL

## 2024-06-24 VITALS
BODY MASS INDEX: 48.82 KG/M2 | RESPIRATION RATE: 16 BRPM | WEIGHT: 293 LBS | DIASTOLIC BLOOD PRESSURE: 84 MMHG | HEIGHT: 65 IN | HEART RATE: 92 BPM | SYSTOLIC BLOOD PRESSURE: 126 MMHG | OXYGEN SATURATION: 97 % | TEMPERATURE: 97.3 F

## 2024-06-24 DIAGNOSIS — Z12.4 CERVICAL CANCER SCREENING: ICD-10-CM

## 2024-06-24 DIAGNOSIS — B37.31 CANDIDAL VULVOVAGINITIS: ICD-10-CM

## 2024-06-24 DIAGNOSIS — Z00.00 ROUTINE GENERAL MEDICAL EXAMINATION AT A HEALTH CARE FACILITY: Primary | ICD-10-CM

## 2024-06-24 DIAGNOSIS — E66.01 MORBID OBESITY (H): ICD-10-CM

## 2024-06-24 LAB
CLUE CELLS: ABNORMAL
HPV HR 12 DNA CVX QL NAA+PROBE: NEGATIVE
HPV16 DNA CVX QL NAA+PROBE: NEGATIVE
HPV18 DNA CVX QL NAA+PROBE: NEGATIVE
HUMAN PAPILLOMA VIRUS FINAL DIAGNOSIS: NORMAL
TRICHOMONAS, WET PREP: ABNORMAL
WBC'S/HIGH POWER FIELD, WET PREP: ABNORMAL
YEAST, WET PREP: PRESENT

## 2024-06-24 PROCEDURE — 87624 HPV HI-RISK TYP POOLED RSLT: CPT | Performed by: FAMILY MEDICINE

## 2024-06-24 PROCEDURE — 87210 SMEAR WET MOUNT SALINE/INK: CPT | Performed by: FAMILY MEDICINE

## 2024-06-24 PROCEDURE — 99213 OFFICE O/P EST LOW 20 MIN: CPT | Mod: 25 | Performed by: FAMILY MEDICINE

## 2024-06-24 PROCEDURE — 90471 IMMUNIZATION ADMIN: CPT | Performed by: FAMILY MEDICINE

## 2024-06-24 PROCEDURE — G0145 SCR C/V CYTO,THINLAYER,RESCR: HCPCS | Performed by: FAMILY MEDICINE

## 2024-06-24 PROCEDURE — 99395 PREV VISIT EST AGE 18-39: CPT | Mod: 25 | Performed by: FAMILY MEDICINE

## 2024-06-24 PROCEDURE — 90715 TDAP VACCINE 7 YRS/> IM: CPT | Performed by: FAMILY MEDICINE

## 2024-06-24 RX ORDER — FLUCONAZOLE 150 MG/1
150 TABLET ORAL
Qty: 3 TABLET | Refills: 0 | Status: SHIPPED | OUTPATIENT
Start: 2024-06-24 | End: 2024-07-01

## 2024-06-24 RX ORDER — AMOXICILLIN 875 MG
875 TABLET ORAL 2 TIMES DAILY
COMMUNITY
Start: 2024-06-17 | End: 2024-09-05

## 2024-06-24 SDOH — HEALTH STABILITY: PHYSICAL HEALTH: ON AVERAGE, HOW MANY DAYS PER WEEK DO YOU ENGAGE IN MODERATE TO STRENUOUS EXERCISE (LIKE A BRISK WALK)?: 2 DAYS

## 2024-06-24 ASSESSMENT — ANXIETY QUESTIONNAIRES
4. TROUBLE RELAXING: SEVERAL DAYS
GAD7 TOTAL SCORE: 4
5. BEING SO RESTLESS THAT IT IS HARD TO SIT STILL: NOT AT ALL
GAD7 TOTAL SCORE: 4
1. FEELING NERVOUS, ANXIOUS, OR ON EDGE: SEVERAL DAYS
IF YOU CHECKED OFF ANY PROBLEMS ON THIS QUESTIONNAIRE, HOW DIFFICULT HAVE THESE PROBLEMS MADE IT FOR YOU TO DO YOUR WORK, TAKE CARE OF THINGS AT HOME, OR GET ALONG WITH OTHER PEOPLE: SOMEWHAT DIFFICULT
2. NOT BEING ABLE TO STOP OR CONTROL WORRYING: SEVERAL DAYS
8. IF YOU CHECKED OFF ANY PROBLEMS, HOW DIFFICULT HAVE THESE MADE IT FOR YOU TO DO YOUR WORK, TAKE CARE OF THINGS AT HOME, OR GET ALONG WITH OTHER PEOPLE?: SOMEWHAT DIFFICULT
GAD7 TOTAL SCORE: 4
7. FEELING AFRAID AS IF SOMETHING AWFUL MIGHT HAPPEN: NOT AT ALL
7. FEELING AFRAID AS IF SOMETHING AWFUL MIGHT HAPPEN: NOT AT ALL
6. BECOMING EASILY ANNOYED OR IRRITABLE: NOT AT ALL
3. WORRYING TOO MUCH ABOUT DIFFERENT THINGS: SEVERAL DAYS

## 2024-06-24 ASSESSMENT — SOCIAL DETERMINANTS OF HEALTH (SDOH): HOW OFTEN DO YOU GET TOGETHER WITH FRIENDS OR RELATIVES?: ONCE A WEEK

## 2024-06-24 ASSESSMENT — PATIENT HEALTH QUESTIONNAIRE - PHQ9
SUM OF ALL RESPONSES TO PHQ QUESTIONS 1-9: 3
10. IF YOU CHECKED OFF ANY PROBLEMS, HOW DIFFICULT HAVE THESE PROBLEMS MADE IT FOR YOU TO DO YOUR WORK, TAKE CARE OF THINGS AT HOME, OR GET ALONG WITH OTHER PEOPLE: SOMEWHAT DIFFICULT
SUM OF ALL RESPONSES TO PHQ QUESTIONS 1-9: 3

## 2024-06-24 ASSESSMENT — PAIN SCALES - GENERAL: PAINLEVEL: NO PAIN (0)

## 2024-06-24 NOTE — NURSING NOTE
Per orders of FS, injection of TDAP given by Joie Juárez RN. Prior to immunization administration, verified patients identity using patient s name and date of birth. Patient instructed to remain in clinic for 15 minutes afterwards, and to report any adverse reaction to me or clinic staff immediately.    Joie Juárez RN on 6/24/2024 at 10:49 AM.    Please see Immunization Activity for additional information.

## 2024-06-24 NOTE — PROGRESS NOTES
"Preventive Care Visit  [facility]  Berna Valladares MD, Family Medicine  Jun 24, 2024      Assessment & Plan     Jennifer was seen today for physical, recheck medication, repeat pap smear and ear problem.    Diagnoses and all orders for this visit:    Routine general medical examination at a health care facility  -     Lipid panel reflex to direct LDL Fasting; Future  -     Hemoglobin A1c; Future  -     Comprehensive metabolic panel (BMP + Alb, Alk Phos, ALT, AST, Total. Bili, TP); Future  -     CBC with platelets; Future    Morbid obesity (H)  Patient was scheduled for weight management visit.  -     Lipid panel reflex to direct LDL Fasting; Future  -     Hemoglobin A1c; Future    Candidal vulvovaginitis  -     Wet prep - Clinic Collect  -     fluconazole (DIFLUCAN) 150 MG tablet; Take 1 tablet (150 mg) by mouth every 3 days for 3 doses    Cervical cancer screening  -     Pap Screen with HPV - Recommended Age 30 - 65 Years    Other orders  -     TDAP 10-64Y (ADACEL,BOOSTRIX)  -     REVIEW OF HEALTH MAINTENANCE PROTOCOL ORDERS  -     PRIMARY CARE FOLLOW-UP SCHEDULING; Future         Follow-up Visit   Expected date:  Jun 24, 2025 (Approximate)      Follow Up Appointment Details:     Follow-up with whom?: PCP    Follow-Up for what?: Adult Preventive    How?: In Person                       BMI  Estimated body mass index is 56.51 kg/m  as calculated from the following:    Height as of this encounter: 1.651 m (5' 5\").    Weight as of this encounter: 154 kg (339 lb 9.6 oz).     Counseling  Appropriate preventive services were discussed with this patient, including applicable screening as appropriate for fall prevention, nutrition, physical activity, Tobacco-use cessation, weight loss and cognition.  Checklist reviewing preventive services available has been given to the patient.  Reviewed patient's diet, addressing concerns and/or questions.   She is at risk for lack of exercise and has been " provided with information to increase physical activity for the benefit of her well-being.   She is at risk for psychosocial distress and has been provided with information to reduce risk.     Steve Rodriguez is a 33 year old, presenting for the following:  Physical (Pt is not fasting), Recheck Medication, Repeat Pap Smear, and Ear Problem        6/24/2024     9:15 AM   Additional Questions   Roomed by Tish FERGUSON   Accompanied by n/a        Health Care Directive  Patient does not have a Health Care Directive or Living Will: Discussed advance care planning with patient; information given to patient to review.    HPI  Concern - Ear Problem  Onset: Since last Monday   Description: Bilateral ear pain followed sound   Intensity: mild  Progression of Symptoms:  improving  Accompanying Signs & Symptoms: None   Previous history of similar problem: none   Precipitating factors:        Worsened by: None   Alleviating factors:        Improved by: Amoxicillin 875 mg only the pain, not the sound   Therapies tried and outcome: Amoxicillin 875 mg - abx completed 06/22/2024          2/10/2023   Nutrition   Three or more servings of calcium each day? Yes   Diet: gluten- free/reduced            2/10/2023   Exercise   Frequency of exercise: 2-3 days/week               2/10/2023   Dental   Dentist two times every year? Yes      Today's PHQ-9 Score:       6/24/2024     9:25 AM   PHQ-9 SCORE   PHQ-9 Total Score MyChart 3 (Minimal depression)   PHQ-9 Total Score 3         2/10/2023   Substance Use   Alcohol more than 3/day or more than 7/wk No        Social History     Tobacco Use    Smoking status: Never    Smokeless tobacco: Never   Vaping Use    Vaping status: Never Used   Substance Use Topics    Alcohol use: Yes     Comment: Soc-wine beer mixed drink    Drug use: Never     History of abnormal Pap smear: No - age 30- 64 PAP with HPV every 5 years recommended        9/10/2021    10:47 AM 7/10/2017    12:00 AM   PAP / HPV   PAP  "Negative for Intraepithelial Lesion or Malignancy (NILM)     PAP-ABSTRACT  See Scanned Document           This result is from an external source.        Reviewed and updated as needed this visit by Provider                    Review of Systems  Constitutional, neuro, ENT, endocrine, pulmonary, cardiac, gastrointestinal, genitourinary, musculoskeletal, integument and psychiatric systems are negative, except as otherwise noted.     Objective    Exam  /84 (BP Location: Right arm, Patient Position: Sitting)   Pulse 92   Temp 97.3  F (36.3  C) (Temporal)   Resp 16   Ht 1.651 m (5' 5\")   Wt (!) 154 kg (339 lb 9.6 oz)   LMP 06/15/2024 (Exact Date)   SpO2 97%   BMI 56.51 kg/m     Estimated body mass index is 56.51 kg/m  as calculated from the following:    Height as of this encounter: 1.651 m (5' 5\").    Weight as of this encounter: 154 kg (339 lb 9.6 oz).    Physical Exam  GENERAL: alert and no distress  EYES: Eyes grossly normal to inspection, PERRL and conjunctivae and sclerae normal  HENT: ear canals and TM's normal, nose and mouth without ulcers or lesions  NECK: no adenopathy, no asymmetry, masses, or scars  RESP: lungs clear to auscultation - no rales, rhonchi or wheezes  CV: regular rate and rhythm, normal S1 S2, no S3 or S4, no murmur, click or rub, no peripheral edema  ABDOMEN: soft, nontender, no hepatosplenomegaly, no masses and bowel sounds normal   (female): normal female external genitalia, normal urethral meatus, normal vaginal mucosa  MS: no gross musculoskeletal defects noted, no edema  SKIN: no suspicious lesions or rashes  NEURO: Normal strength and tone, mentation intact and speech normal  PSYCH: mentation appears normal, affect normal/bright        Signed Electronically by: Berna Valladares MD    "

## 2024-06-24 NOTE — RESULT ENCOUNTER NOTE
Dear Jennifer,   Your results are consistent with vaginal candidiasis  also known as a yeast infection.  This is likely secondary to your recent antibiotic use.  I sent a prescription for Diflucan to your pharmacy on file.    Best Regards  Berna Valladares MD

## 2024-06-24 NOTE — PATIENT INSTRUCTIONS
"Patient Education   Preventive Care Advice   This is general advice we often give to help people stay healthy. Your care team may have specific advice just for you. Please talk to your care team about your own preventive care needs.  Lifestyle  Exercise at least 150 minutes each week (30 minutes a day, 5 days a week).  Do muscle strengthening activities 2 days a week. These help control your weight and prevent disease.  No smoking.  Wear sunscreen to prevent skin cancer.  Have your home tested for radon every 2 to 5 years. Radon is a colorless, odorless gas that can harm your lungs. To learn more, go to www.health.[facility].mn.us and search for \"Radon in Homes.\"  Keep guns unloaded and locked up in a safe place like a safe or gun vault, or, use a gun lock and hide the keys. Always lock away bullets separately. To learn more, visit [place].gov and search for \"safe gun storage.\"  Nutrition  Eat 5 or more servings of fruits and vegetables each day.  Try wheat bread, brown rice and whole grain pasta (instead of white bread, rice, and pasta).  Get enough calcium and vitamin D. Check the label on foods and aim for 100% of the RDA (recommended daily allowance).  Regular exams  Have a dental exam and cleaning every 6 months.  See your health care team every year to talk about:  Any changes in your health.  Any medicines your care team has prescribed.  Preventive care, family planning, and ways to prevent chronic diseases.  Shots (vaccines)   HPV shots (up to age 26), if you've never had them before.  Hepatitis B shots (up to age 59), if you've never had them before.  COVID-19 shot: Get this shot when it's due.  Flu shot: Get a flu shot every year.  Tetanus shot: Get a tetanus shot every 10 years.  Pneumococcal, hepatitis A, and RSV shots: Ask your care team if you need these based on your risk.  Shingles shot (for age 50 and up).  General health tests  Diabetes screening:  Starting at age 35, Get screened for diabetes at least " every 3 years.  If you are younger than age 35, ask your care team if you should be screened for diabetes.  Cholesterol test: At age 39, start having a cholesterol test every 5 years, or more often if advised.  Bone density scan (DEXA): At age 50, ask your care team if you should have this scan for osteoporosis (brittle bones).  Hepatitis C: Get tested at least once in your life.  Abdominal aortic aneurysm screening: Talk to your doctor about having this screening if you:  Have ever smoked; and  Are biologically male; and  Are between the ages of 65 and 75.  STIs (sexually transmitted infections)  Before age 24: Ask your care team if you should be screened for STIs.  After age 24: Get screened for STIs if you're at risk. You are at risk for STIs (including HIV) if:  You are sexually active with more than one person.  You don't use condoms every time.  You or a partner was diagnosed with a sexually transmitted infection.  If you are at risk for HIV, ask about PrEP medicine to prevent HIV.  Get tested for HIV at least once in your life, whether you are at risk for HIV or not.  Cancer screening tests  Cervical cancer screening: If you have a cervix, begin getting regular cervical cancer screening tests at age 21. Most people who have regular screenings with normal results can stop after age 65. Talk about this with your provider.  Breast cancer scan (mammogram): If you've ever had breasts, begin having regular mammograms starting at age 40. This is a scan to check for breast cancer.  Colon cancer screening: It is important to start screening for colon cancer at age 45.  Have a colonoscopy test every 10 years (or more often if you're at risk) Or, ask your provider about stool tests like a FIT test every year or Cologuard test every 3 years.  To learn more about your testing options, visit: www.[employer] [place]/686555.pdf.  For help making a decision, visit: ananya/ha08768.  Prostate cancer screening test: If you have a  prostate and are age 55 to 69, ask your provider if you would benefit from a yearly prostate cancer screening test.  Lung cancer screening: If you are a current or former smoker age 50 to 80, ask your care team if ongoing lung cancer screenings are right for you.  For informational purposes only. Not to replace the advice of your health care provider. Copyright   2023 [facility]. All rights reserved. Clinically reviewed by the [facility]. Logly [id number] - REV 04/24.

## 2024-06-27 ENCOUNTER — MYC MEDICAL ADVICE (OUTPATIENT)
Dept: FAMILY MEDICINE | Facility: CLINIC | Age: 34
End: 2024-06-27

## 2024-06-27 ENCOUNTER — VIRTUAL VISIT (OUTPATIENT)
Dept: FAMILY MEDICINE | Facility: CLINIC | Age: 34
End: 2024-06-27
Payer: COMMERCIAL

## 2024-06-27 ENCOUNTER — TELEPHONE (OUTPATIENT)
Dept: FAMILY MEDICINE | Facility: CLINIC | Age: 34
End: 2024-06-27

## 2024-06-27 DIAGNOSIS — F41.9 ANXIETY: ICD-10-CM

## 2024-06-27 DIAGNOSIS — E66.01 MORBID OBESITY (H): Primary | ICD-10-CM

## 2024-06-27 LAB
BKR LAB AP GYN ADEQUACY: NORMAL
BKR LAB AP GYN INTERPRETATION: NORMAL
BKR LAB AP LMP: NORMAL
BKR LAB AP PREVIOUS ABNORMAL: NORMAL
PATH REPORT.COMMENTS IMP SPEC: NORMAL
PATH REPORT.COMMENTS IMP SPEC: NORMAL
PATH REPORT.RELEVANT HX SPEC: NORMAL

## 2024-06-27 PROCEDURE — 99214 OFFICE O/P EST MOD 30 MIN: CPT | Mod: 95 | Performed by: FAMILY MEDICINE

## 2024-06-27 PROCEDURE — G2211 COMPLEX E/M VISIT ADD ON: HCPCS | Mod: 95 | Performed by: FAMILY MEDICINE

## 2024-06-27 RX ORDER — HYDROXYZINE HYDROCHLORIDE 25 MG/1
25 TABLET, FILM COATED ORAL 3 TIMES DAILY PRN
Qty: 270 TABLET | Refills: 1 | Status: SHIPPED | OUTPATIENT
Start: 2024-06-27

## 2024-06-27 NOTE — PATIENT INSTRUCTIONS
MEDICATION STARTED AT THIS APPOINTMENT: Zepbound  FOLLOW-UP:  4 weeks. In person visit or Virtual visit with nurse only visit for Vitals.     We are starting a GLP-1 (Glucagon-like Peptide-1) medication. Examples of this medication include Zepbound, Wegovy, Saxenda or Victoza, etc. The medication chosen will depend on insurance coverage, and can be changed to the medication that is covered under your plan. These medications work the same, in that they can help you lose weight and control your blood sugar. One of the ways it works is by slowing down the rate that food leaves your stomach. You feel adamson and will eat less.  It also helps regulate hormones that can help improve your blood sugars.    Usually short acting insulins or oral agents are stopped or decreased by the doctor at the appointment. Low blood sugars are rare but can happen if patients are on insulin or other oral agents. If you notice consistent low sugars or high sugars, your medication may need to be adjusted after your appointment. If this is the case, please call RN and provide her your blood sugar record from the last 3-4 days. The RN will get in touch with the doctor and call you back/Akvolutiont message with recommendations. We tolerate high sugars for a bit, so if sugars are running 180-200, this is ok. As weight starts dropping the blood sugars should too. If readings are consistently over 200 for 1-2 weeks, then you should call the doctor/nurse.    Zepbound: Starting dose is 2.5 mg once weekly for 4 weeks, and then increase to 5 mg x 4 weeks, 7.5mg x 4 weeks and possibly 10mg maintenance dose    Side effects of GLP- Medications include: The most common side effects are all GI related and consist of: nausea, constipation, diarrhea, burping, or gassiness. Patients are advised to eat slowly and less, and nausea typically passes if people can stick it out.     The risk of pancreatitis (inflammation of the pancreas) has been associated with this  type of medication, but is very rare.  If you have had pancreatitis in the past, this medication may not be for you. Please let us know about any past history of pancreas problems.      Symptoms of pancreatitis include: Pain in your upper stomach area which  may travel to your back and be worse after eating. Your stomach area may be tender to the touch.  You may have vomiting or nausea and/or have a fever. If you should develop any of these symptoms, stop the medication and contact your primary care doctor. They will do a blood test to check for pancreatitis.         There is a small chance you may have some low blood sugar after taking the medication.   The signs of low blood sugar are:  Weakness  Shaky   Hungry  Sweating  Confusion      See below for ways to treat low blood sugar without adding in lots of extra calories.      Treating Low Blood Sugar    If you have symptoms of low blood sugar (sweating, shaking, dizzy, confused) eat 15 grams of carbs and wait 15 minutes:    Glucose Tabs are best for sugars under 70 -  Dex4 or BD Glucose tablets are good, you will need to take 3-4 of these to equal 15 grams.     One small box of raisins  4 oz fruit juice box or   cup fruit juice  1 small apple  1 small banana    cup canned fruit in water    English muffin or a slice of bread with jelly   1 low fat frozen waffle with sugar-free syrup    cup cottage cheese with   cup frozen or fresh blueberries  1 cup skim or low-fat milk    cup whole grain cereal  4-6 crackers such as Triscuits      In order to get refills of this or any medication we prescribe you must be seen at least once every 2 months. If you plan to schedule a virtual visit, please check your weight on the day of the visit.        Eat Better ? Move More ? Live Well    Eat 3 nutrient-rich meals each day    Don t skip meals--it will cause you to overeat later in the day!    Eating fiber (vegetables/fruits/whole grains) and protein with meals helps you stay full  longer    Choose foods with less than 10 grams of sugar and 5 grams of fat per serving to prevent excess calories and weight re-gain  Eat around the same times each day to develop a routine eating schedule   Avoid snacking unless physically hungry.   Planned snacks: 1-2 times per day and no more than 150 calories    Eat protein first   Protein helps with healing, maintaining adequate muscle mass, reducing hunger and optimizing nutritional status   Aim for 60-80 grams of protein per day   Fill up on Fiber   Fiber comes from plants--fruits, veggies, whole grains, nuts/seeds and beans   Fiber is low in calories, high in phytonutrients and helps you stay full longer   Aim for 25-35 grams per day by eating fiber with meals and snacks  Eat S-L-O-W-L-Y   Take 20-30 minutes to eat each meal by taking small bites, chewing foods to applesauce consistency or 20-30 times before you swallow   Eating foods too fast can delay satiety/fullness signals and increase overeating   Slow down your eating by using toddler utensils, putting your fork/spoon down between bites and not watching TV or emailing during meals!   Keep a Journal         Writing down what you eat, how you feel and when you are active helps you identify new changes to work on from week to week         Look for ways to cut 100 calories from your current diet 2-3 times per day  Drink 64 ounces of 0-Calorie drinks between meals   Water   Zero calorie Propel  or Vitamin Water     SoBe Lifewater  Zero Calories   Crystal Light , Sugar-Free Anastacio-Aid , and other sugar-free lemonade or flavored odom   Keep Caffeine to less than 300mg per day ie: 3-6oz cups coffee     Work up to 45-60 minutes of physical activity most days of the week   Helps with losing weight and prevent regaining those extra pounds!    Do a combo of cardio (walking/water exercises) and strength training (lifting weights/Vinyasa yoga)    Avoid Mindless Eating   Be present when you eat--take note of the  "smell, taste and quality of your food   Make a list of alternative activities you could do to prevent eating out of boredom/stress  Go for a walk, call a friend, chew gum, paint your nails, re-organize the garage, etc     Nutrition Goals  1) 9\" Plate method (1/2 plate non-starchy vegetables/fruit, 1/4 plate lean protein, 1/4 plate whole grain starch - no more than 1/2 cup carb/meal)  2) Eat 3 meals per day  3) Consume 60-90 g protein per day  4) Avoid snacking as able. If snack is needed use lean protein and/or fruit/vegetable. Examples:              - 2 cup popcorn              - 1 cup mixed berries              - 15 almonds, walnuts, cashews              - carrot/celery sticks and 2 tbsp low-fat ranch              - 1 hard boiled egg              - Part-skim mozzarella cheese stick              - Low-fat, low-sugar greek yogurt with 1/2 cup berries              - Med apple or pear              - sliced bell peppers with 1/2 cup salsa              - 1/2 cup roasted chickpeas              - sliced cucumbers with vinegar        Healthy Recipe Resources:  Books:  \"The Volumetrics Eating Plan\" by Juanis Calzada, Ph.D.  \"Cooking that Counts\" by editors of Knowledge Factor  \"Calorie Smart Meals\" cookbook by [employer] and [employer] Medoras (200-500 calorie meals)     Websites:  www.Constant Therapy  www.[employer].org  https://www.diabetesfoodhub.org/all-recipes.html  https://www.Bitmenu.SinglePipe Communications/  Https://www.choosemyplate.gov/myplatekitchen  https://snaped.fns.usda.gov/recipes-menus   https://www.GamyTech.SinglePipe Communications/karen/1398375/dietitian-budget-high-protein-dinner-recipes/  https://www.Sanswire.SinglePipe Communications/recipes/84/healthy-recipes/   Https://www.Senic.com/c/LeadiD   EatCFO.com.com         Cultural Cuisines:  https://www.GamyTech.com/recipes/43774/cuisines-regions//  https://www.firstnations.org/knowledge-center/recipes/  https://RTN Stealth Software/recipe-index/  https://Awareness Card.SinglePipe Communications/     Apps:  [employer] nilda (or " website, mealime.com)   Rachael         The Plate Method  Http://www.A Bit Lucky/916245.pdf     Protein Sources   http://A Bit Lucky/217298.pdf      Carbohydrates  http://fvfiles.com/718827.pdf      Mindful Eating  http://A Bit Lucky/978414.pdf      Summary of Volumetrics Eating Plan  http://fvfiles.com/467156.pdf

## 2024-06-27 NOTE — TELEPHONE ENCOUNTER
Prior Authorization Retail Medication Request    Medication/Dose: tirzepatide-Weight Management (ZEPBOUND) 2.5 MG/0.5ML prefilled pen 2 mL 0 6/27/2024 7/25/2024 No  Sig - Route: Inject 0.5 mLs (2.5 mg) Subcutaneous every 7 days for 28 days - Subcutaneous  Sent to pharmacy as: Tirzepatide-Weight Management 2.5 MG/0.5ML Subcutaneous Solution Auto-injector (ZEPBOUND      Diagnosis and ICD code (if different than what is on RX):  E66.01    New/renewal/insurance change PA/secondary ins. PA:  Previously Tried and Failed:  Unknown  Rationale:  Unknown    Insurance   Primary: [employer] Open Access  Insurance ID:  [id number]    Secondary (if applicable):  Insurance ID:      Pharmacy Information (if different than what is on RX)  Name:    Phone:    Fax:

## 2024-06-27 NOTE — PROGRESS NOTES
"Jennifer is a 33 year old who is being evaluated via a billable video visit.    How would you like to obtain your AVS? MyChart  If the video visit is dropped, the invitation should be resent by: Text to cell phone: [phone]  Will anyone else be joining your video visit? No  {If patient encounters technical issues they should call [phone] :590143}    {PROVIDER CHARTING PREFERENCE:246436}    Steve Rodriguez is a 33 year old, presenting for the following health issues:  No chief complaint on file.        6/27/2024    11:11 AM   Additional Questions   Roomed by BOLIVAR Sawyer     History of Present Illness       Reason for visit:  Weight loss medication    She eats 0-1 servings of fruits and vegetables daily.She consumes 2 sweetened beverage(s) daily.She exercises with enough effort to increase her heart rate 30 to 60 minutes per day.  She exercises with enough effort to increase her heart rate 3 or less days per week. She is missing 1 dose(s) of medications per week.  She is not taking prescribed medications regularly due to remembering to take.       {SUPERLIST (Optional):404788}  {additonal problems for provider to add (Optional):364591}    {ROS Picklists (Optional):483377}      Objective           Vitals:  No vitals were obtained today due to virtual visit.    Physical Exam   {video visit exam brief selected:850189}    {Diagnostic Test Results (Optional):227001}      Video-Visit Details    Type of service:  Video Visit   Originating Location (pt. Location): {video visit patient location:334914::\"Home\"}  {PROVIDER LOCATION On-site should be selected for visits conducted from your clinic location or adjoining [facility] hospital, academic office, or other nearby [facility] building. Off-site should be selected for all other provider locations, including home:599826}  Distant Location (provider location):  {virtual location provider:900903}  Platform used for Video Visit: {Virtual Visit Platforms:170122::\"QubulusWell\"}  Signed " Electronically by: Berna Valladares MD  {Email feedback regarding this note to [email or web address]   :627633}
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
## 2024-06-28 NOTE — TELEPHONE ENCOUNTER
PRIOR AUTHORIZATION DENIED    Medication: TIRZEPATIDE-WEIGHT MANAGEMENT 2.5 MG/0.5ML SC SOAJ  Insurance Company: [employer] - Phone [phone] Fax [phone]  Denial Date: 6/27/2024  Denial Reason(s):     Appeal Information:     Patient Notified: No

## 2024-07-17 DIAGNOSIS — Z82.49 FAMILY HISTORY OF CHF (CONGESTIVE HEART FAILURE): Primary | ICD-10-CM

## 2024-07-24 ENCOUNTER — MYC MEDICAL ADVICE (OUTPATIENT)
Dept: FAMILY MEDICINE | Facility: CLINIC | Age: 34
End: 2024-07-24
Payer: COMMERCIAL

## 2024-08-15 ENCOUNTER — HOSPITAL ENCOUNTER (OUTPATIENT)
Dept: CARDIOLOGY | Facility: CLINIC | Age: 34
Discharge: HOME | End: 2024-08-15
Attending: FAMILY MEDICINE | Admitting: FAMILY MEDICINE
Payer: COMMERCIAL

## 2024-08-15 DIAGNOSIS — Z82.49 FAMILY HISTORY OF CHF (CONGESTIVE HEART FAILURE): Primary | ICD-10-CM

## 2024-08-15 LAB — LVEF ECHO: NORMAL

## 2024-08-15 PROCEDURE — 255N000002 HC RX 255 OP 636: Performed by: FAMILY MEDICINE

## 2024-08-15 PROCEDURE — 93306 TTE W/DOPPLER COMPLETE: CPT | Mod: 26 | Performed by: INTERNAL MEDICINE

## 2024-08-15 PROCEDURE — 999N000208 ECHOCARDIOGRAM COMPLETE

## 2024-08-15 RX ADMIN — HUMAN ALBUMIN MICROSPHERES AND PERFLUTREN 9 ML: 10; .22 INJECTION, SOLUTION INTRAVENOUS at 08:24

## 2024-09-04 ASSESSMENT — ANXIETY QUESTIONNAIRES
GAD7 TOTAL SCORE: 6
GAD7 TOTAL SCORE: 6
8. IF YOU CHECKED OFF ANY PROBLEMS, HOW DIFFICULT HAVE THESE MADE IT FOR YOU TO DO YOUR WORK, TAKE CARE OF THINGS AT HOME, OR GET ALONG WITH OTHER PEOPLE?: SOMEWHAT DIFFICULT
GAD7 TOTAL SCORE: 6
7. FEELING AFRAID AS IF SOMETHING AWFUL MIGHT HAPPEN: NOT AT ALL

## 2024-09-05 ENCOUNTER — VIRTUAL VISIT (OUTPATIENT)
Dept: FAMILY MEDICINE | Facility: CLINIC | Age: 34
End: 2024-09-05
Payer: COMMERCIAL

## 2024-09-05 DIAGNOSIS — E66.01 MORBID OBESITY (H): Primary | ICD-10-CM

## 2024-09-05 PROCEDURE — 99214 OFFICE O/P EST MOD 30 MIN: CPT | Mod: 95 | Performed by: FAMILY MEDICINE

## 2024-09-05 RX ORDER — PHENTERMINE HYDROCHLORIDE 37.5 MG/1
37.5 TABLET ORAL
Qty: 30 TABLET | Refills: 0 | Status: SHIPPED | OUTPATIENT
Start: 2024-10-03 | End: 2024-11-02

## 2024-09-05 RX ORDER — PHENTERMINE HYDROCHLORIDE 37.5 MG/1
37.5 TABLET ORAL
Qty: 30 TABLET | Refills: 0 | Status: SHIPPED | OUTPATIENT
Start: 2024-09-05

## 2024-09-05 RX ORDER — TOPIRAMATE 25 MG/1
TABLET, FILM COATED ORAL
Qty: 66 TABLET | Refills: 0 | Status: SHIPPED | OUTPATIENT
Start: 2024-09-05 | End: 2024-10-04

## 2024-09-05 RX ORDER — PHENTERMINE HYDROCHLORIDE 37.5 MG/1
37.5 TABLET ORAL
Qty: 30 TABLET | Refills: 0 | Status: SHIPPED | OUTPATIENT
Start: 2024-11-01 | End: 2024-12-01

## 2024-09-05 RX ORDER — TOPIRAMATE 25 MG/1
75 TABLET, FILM COATED ORAL DAILY
Qty: 90 TABLET | Refills: 1 | Status: SHIPPED | OUTPATIENT
Start: 2024-10-03

## 2024-09-05 NOTE — PROGRESS NOTES
Jennifer is a 34 year old who is being evaluated via a billable video visit.    How would you like to obtain your AVS? MyChart  If the video visit is dropped, the invitation should be resent by: Text to cell phone: [phone]  Will anyone else be joining your video visit? No      Assessment & Plan     ASSESSMENT:  Jennifer is a patient with early onset morbid obesity with unknown  element of familial/genetic influence(patient is adopted) and with current health consequences. She does need aggressive weight loss plan due to BMI being greater than 56.51.  Jennifer Lawson eats a high carb diet, eats fast food once or more per week, eats to obtain specific degree of fullness, eats most meals in front of TV, and has a disorganized meal pattern.  Her problem is complicated by poor lifestyle choices and mental health.  Her ability to lose weight is impacted by current work life.    Weight Management Plan  Interval History:  Initial weight (06/27/24)  339 lbs. With BMI (56.51 kg/m2)  GOALS:  Target weight loss > 5% (17 lbs) over the next 3 months.  Long-term goal to maintain weight below  220 lbs  Ancillary testing:  PHQ-9/GAD7  Food Plan:  High protein/low carbohydrate.   Activity Plan:   Activity journal and Exercise after meals.  Medication: Patient was given a prescription for Zepbound in the past but medication was not covered by her insurance.  She desires to start phentermine with Topamax.  Patient had phentermine with Topamax in the past and reported dry mouth and occasional palpitations.  WLS Surgery:  Patient meets NIH criteria for WLS, but is not presently interested in this option  Referrals: Can return to see RD/CDE for further diet teaching as needed. Declined referral at this time.       ICD-10-CM    1. Morbid obesity (H)  E66.01 phentermine (ADIPEX-P) 37.5 MG tablet     topiramate (TOPAMAX) 25 MG tablet     topiramate (TOPAMAX) 25 MG tablet     phentermine (ADIPEX-P) 37.5 MG tablet     phentermine  "(ADIPEX-P) 37.5 MG tablet          FOLLOW-UP:  12 weeks. In person visit or Virtual visit with nurse only visit for Vitals.          Sincerely,    Berna Valladares MD    Video-Visit Details    Type of service:  Video Visit   Originating Location (pt. Location): Home  Distant Location (provider location):  off-site  Platform used for Video Visit: Marycruz  Signed Electronically by: Berna Valladares MD        BMI  Estimated body mass index is 56.51 kg/m  as calculated from the following:    Height as of 6/24/24: 1.651 m (5' 5\").    Weight as of 6/24/24: 154 kg (339 lb 9.6 oz).     Steve Rodriguez is a 34 year old, presenting for the following health issues:  Recheck Medication and Weight Problem        9/5/2024     8:51 AM   Additional Questions   Roomed by Jorge DURAN     History of Present Illness       Reason for visit:  Weight lost medicaation    She eats 0-1 servings of fruits and vegetables daily.She consumes 2 sweetened beverage(s) daily.She exercises with enough effort to increase her heart rate 20 to 29 minutes per day.  She exercises with enough effort to increase her heart rate 3 or less days per week.   She is taking medications regularly.         6/27/2024     1:47 AM   --   I have the following health issues associated with obesity None of the above   I have the following symptoms associated with obesity Depression    Lower Extremity Swelling    Back Pain    Fatigue         9/4/2024    12:24 PM   Changes and Difficulties   With regards to my diet, I am still struggling with: Weight   I have made the following changes to my activity/exercise since my last visit: More exercise         9/4/2024    12:24 PM   Weight Loss Medication History Reviewed With Patient   Which weight loss medications are you currently taking on a regular basis? None   If you are not taking a weight loss medication that was prescribed to you, please indicate why: Other   Are you having any side effects from the weight loss " medication that we have prescribed you? No       Review of Systems  Constitutional, neuro, ENT, endocrine, pulmonary, cardiac, gastrointestinal, genitourinary, musculoskeletal, integument and psychiatric systems are negative, except as otherwise noted.      Objective    Vitals - Patient Reported  Pain Score: No Pain (0)      Vitals:  No vitals were obtained today due to virtual visit.    Physical Exam   GENERAL: alert and no distress  EYES: Eyes grossly normal to inspection.  No discharge or erythema, or obvious scleral/conjunctival abnormalities.  RESP: No audible wheeze, cough, or visible cyanosis.    SKIN: Visible skin clear. No significant rash, abnormal pigmentation or lesions.  NEURO: Cranial nerves grossly intact.  Mentation and speech appropriate for age.  PSYCH: Appropriate affect, tone, and pace of words    No results found for any visits on 09/05/24.      Video-Visit Details    Type of service:  Video Visit   Originating Location (pt. Location): Home    Distant Location (provider location):  Off-site  Platform used for Video Visit: Marycruz  Signed Electronically by: Berna Valladares MD

## 2024-10-25 ENCOUNTER — MYC REFILL (OUTPATIENT)
Dept: FAMILY MEDICINE | Facility: CLINIC | Age: 34
End: 2024-10-25
Payer: COMMERCIAL

## 2024-10-25 DIAGNOSIS — E66.01 MORBID OBESITY (H): ICD-10-CM

## 2024-10-28 ENCOUNTER — MYC REFILL (OUTPATIENT)
Dept: FAMILY MEDICINE | Facility: CLINIC | Age: 34
End: 2024-10-28
Payer: COMMERCIAL

## 2024-10-28 DIAGNOSIS — E66.01 MORBID OBESITY (H): ICD-10-CM

## 2024-10-28 RX ORDER — PHENTERMINE HYDROCHLORIDE 37.5 MG/1
37.5 TABLET ORAL
Qty: 30 TABLET | Refills: 0 | OUTPATIENT
Start: 2024-10-28

## 2024-10-28 RX ORDER — TOPIRAMATE 25 MG/1
TABLET, FILM COATED ORAL
Qty: 66 TABLET | Refills: 0 | OUTPATIENT
Start: 2024-10-28 | End: 2024-11-25

## 2024-12-25 DIAGNOSIS — E66.01 MORBID OBESITY (H): ICD-10-CM

## 2024-12-26 RX ORDER — TOPIRAMATE 25 MG/1
75 TABLET, FILM COATED ORAL DAILY
Qty: 90 TABLET | Refills: 1 | Status: SHIPPED | OUTPATIENT
Start: 2024-12-26

## 2025-05-26 ENCOUNTER — PATIENT OUTREACH (OUTPATIENT)
Dept: CARE COORDINATION | Facility: CLINIC | Age: 35
End: 2025-05-26
Payer: COMMERCIAL

## 2025-06-09 ENCOUNTER — PATIENT OUTREACH (OUTPATIENT)
Dept: CARE COORDINATION | Facility: CLINIC | Age: 35
End: 2025-06-09
Payer: COMMERCIAL

## 2025-08-02 ENCOUNTER — HEALTH MAINTENANCE LETTER (OUTPATIENT)
Age: 35
End: 2025-08-02